# Patient Record
Sex: MALE | Race: BLACK OR AFRICAN AMERICAN | HISPANIC OR LATINO
[De-identification: names, ages, dates, MRNs, and addresses within clinical notes are randomized per-mention and may not be internally consistent; named-entity substitution may affect disease eponyms.]

---

## 2019-09-30 ENCOUNTER — APPOINTMENT (OUTPATIENT)
Dept: ORTHOPEDIC SURGERY | Facility: CLINIC | Age: 49
End: 2019-09-30
Payer: OTHER MISCELLANEOUS

## 2019-09-30 DIAGNOSIS — Z87.39 PERSONAL HISTORY OF OTHER DISEASES OF THE MUSCULOSKELETAL SYSTEM AND CONNECTIVE TISSUE: ICD-10-CM

## 2019-09-30 PROBLEM — Z00.00 ENCOUNTER FOR PREVENTIVE HEALTH EXAMINATION: Status: ACTIVE | Noted: 2019-09-30

## 2019-09-30 PROCEDURE — 99203 OFFICE O/P NEW LOW 30 MIN: CPT

## 2019-09-30 PROCEDURE — 73562 X-RAY EXAM OF KNEE 3: CPT | Mod: 50

## 2019-09-30 RX ORDER — TRAMADOL HYDROCHLORIDE 50 MG/1
50 TABLET, COATED ORAL
Refills: 0 | Status: ACTIVE | COMMUNITY

## 2019-09-30 NOTE — HISTORY OF PRESENT ILLNESS
[Pain Location] : pain [9] : a maximum pain level of 9/10 [] : left knee [Walking] : walking [Daily] : ~He/She~ states the symptoms seem to be occuring daily [Sitting] : sitting [Standing] : standing [de-identified] : 49 year old male s/p left total knee replacement on August 1, 2017 with another surgeon presents to the office today for a second opinion. He initially had 2 arthroscopies done on the left knee in 2016. Pt then went on to have a total knee replacement in August of 2017. Pt continued to have pain and went on to have a manipulation in September 2017, left knee irrigation and debridement with exchange of poly bearing in December of 2017, and a left knee open biopsy in January 2019. In 2018 patient went for a bone scan, white blood cell scan, and a bone marrow scan. Pt continues to complain of pain in the knee that is achy and shooting in nature. He reports swelling, buckling, clicking, locking, and loss of motion. Pt ambulates with a cane and reports only being able to ambulate less than 1/2 block before pain stops him. Pt reports pain and difficulty with negotiating stairs and states he only sleeps about one hour per night due to the pain. He was taking Tramadol for pain with some relief, but just stopped because he was advised to go to pain management. Pt is here today to discuss his next options for pain relief.

## 2019-09-30 NOTE — PHYSICAL EXAM
[de-identified] :  Pt was in so much pain, he was not compliant with physical exam. Knee was clearly swollen.  [de-identified] : Radiographs done today AP lateral and skyline of both knees shows a well laigned cemented PS TKA with no evidence of loosening, or infection.

## 2019-09-30 NOTE — ASSESSMENT
[FreeTextEntry1] : Pt s/p total knee with multiple subsequent procedures with pain and swelling of unknown etiology. I will discuss with Dr Olivas to determine what the next best step is.

## 2020-01-06 ENCOUNTER — APPOINTMENT (OUTPATIENT)
Dept: ORTHOPEDIC SURGERY | Facility: CLINIC | Age: 50
End: 2020-01-06
Payer: OTHER MISCELLANEOUS

## 2020-01-06 PROCEDURE — 36415 COLL VENOUS BLD VENIPUNCTURE: CPT

## 2020-01-06 PROCEDURE — 99213 OFFICE O/P EST LOW 20 MIN: CPT

## 2020-01-06 NOTE — HISTORY OF PRESENT ILLNESS
[Pain Location] : pain [9] : a maximum pain level of 9/10 [] : left knee [Walking] : walking [Sitting] : sitting [Standing] : standing [Daily] : ~He/She~ states the symptoms seem to be occuring daily [de-identified] : 9/30/2019 - 49 year old male s/p left total knee replacement on August 1, 2017 with another surgeon presents to the office today for a second opinion. He initially had 2 arthroscopies done on the left knee in 2016. Pt then went on to have a total knee replacement in August of 2017. Pt continued to have pain and went on to have a manipulation in September 2017, left knee irrigation and debridement with exchange of poly bearing in December of 2017, and a left knee open biopsy in January 2019. In 2018 patient went for a bone scan, white blood cell scan, and a bone marrow scan. Pt continues to complain of pain in the knee that is achy and shooting in nature. He reports swelling, buckling, clicking, locking, and loss of motion. Pt ambulates with a cane and reports only being able to ambulate less than 1/2 block before pain stops him. Pt reports pain and difficulty with negotiating stairs and states he only sleeps about one hour per night due to the pain. He was taking Tramadol for pain with some relief, but just stopped because he was advised to go to pain management. Pt is here today to discuss his next options for pain relief. \par \par 1/6/2020 - 49 year old male s/p left total knee replacement presents to the office today for a metal allergy testing. We have received a metal allergy testing kit from [facility]. Pt continues to complain of pain in the left knee after his knee replacement in August 2017. We will draw blood today for his metal allergy test with orthopedic analysis using Panel 2.

## 2020-01-06 NOTE — PHYSICAL EXAM
[de-identified] :  Pt was in so much pain, he was not compliant with physical exam. Knee was clearly swollen.  [de-identified] : Radiographs done today AP lateral and skyline of both knees shows a well laigned cemented PS TKA with no evidence of loosening, or infection.

## 2020-01-06 NOTE — ASSESSMENT
[FreeTextEntry1] : We will follow up once we receive the results. We have mailed back the blood to be received by orthopedic analysis by tomorrow 17/2019.

## 2020-06-07 ENCOUNTER — TRANSCRIPTION ENCOUNTER (OUTPATIENT)
Age: 50
End: 2020-06-07

## 2020-06-18 ENCOUNTER — TRANSCRIPTION ENCOUNTER (OUTPATIENT)
Age: 50
End: 2020-06-18

## 2020-07-10 ENCOUNTER — APPOINTMENT (OUTPATIENT)
Dept: ORTHOPEDIC SURGERY | Facility: CLINIC | Age: 50
End: 2020-07-10
Payer: OTHER MISCELLANEOUS

## 2020-07-10 DIAGNOSIS — M25.551 PAIN IN RIGHT HIP: ICD-10-CM

## 2020-07-10 DIAGNOSIS — M25.552 PAIN IN LEFT HIP: ICD-10-CM

## 2020-07-10 PROCEDURE — 73562 X-RAY EXAM OF KNEE 3: CPT | Mod: RT

## 2020-07-10 PROCEDURE — 99214 OFFICE O/P EST MOD 30 MIN: CPT

## 2020-07-10 PROCEDURE — 73502 X-RAY EXAM HIP UNI 2-3 VIEWS: CPT | Mod: RT

## 2020-07-10 NOTE — PHYSICAL EXAM
[de-identified] : Radiographs done today AP lateral and skyline of the left knee shows well aligned well fixed cemented PCR TKA. No evidence of loosening\par \par Radiographs done today AP pelvis and lateral of the left hip shows the bony structures are intact , no evidence of arthritis or AVN of the left hip.  [de-identified] : unchanged

## 2020-07-10 NOTE — ASSESSMENT
[FreeTextEntry1] : Pt metal allergy work up is unremarkable. Still under the care of pain management. I spoke to Dr Felix, she said that the pain relief that the patient experienced immediately after the nerve blocks were consistent with RSD. Unfortunately the nerve ablation procedure was not helpful to the patient. She still feels that a lumbar sympathectomy could be beneficial. My exam of the patient today, i feel is consistent with RSD. The pt demonstrated significant hypersensitivity of touch to the skin. His pain is clearly out of proportion to his physical and radiographic findings. I reviewed the bone and white cell scans. These are not consistent with loosening or infection but do show evidence of synovitis. Pt will follow up after pain management.

## 2020-07-10 NOTE — HISTORY OF PRESENT ILLNESS
[de-identified] : 7/10/20- 50 year old male presents for follow up. \par \par 9/30/2019 - 49 year old male s/p left total knee replacement on August 1, 2017 with another surgeon presents to the office today for a second opinion. He initially had 2 arthroscopies done on the left knee in 2016. Pt then went on to have a total knee replacement in August of 2017. Pt continued to have pain and went on to have a manipulation in September 2017, left knee irrigation and debridement with exchange of poly bearing in December of 2017, and a left knee open biopsy in January 2019. In 2018 patient went for a bone scan, white blood cell scan, and a bone marrow scan. Pt continues to complain of pain in the knee that is achy and shooting in nature. He reports swelling, buckling, clicking, locking, and loss of motion. Pt ambulates with a cane and reports only being able to ambulate less than 1/2 block before pain stops him. Pt reports pain and difficulty with negotiating stairs and states he only sleeps about one hour per night due to the pain. He was taking Tramadol for pain with some relief, but just stopped because he was advised to go to pain management. Pt is here today to discuss his next options for pain relief. \par \par 1/6/2020 - 49 year old male s/p left total knee replacement presents to the office today for a metal allergy testing. We have received a metal allergy testing kit from [facility]. Pt continues to complain of pain in the left knee after his knee replacement in August 2017. We will draw blood today for his metal allergy test with orthopedic analysis using Panel 2.  [] : left knee [9] : a maximum pain level of 9/10 [Pain Location] : pain [Sitting] : sitting [Walking] : walking [Daily] : ~He/She~ states the symptoms seem to be occuring daily [Standing] : standing

## 2020-10-26 ENCOUNTER — APPOINTMENT (OUTPATIENT)
Dept: ORTHOPEDIC SURGERY | Facility: CLINIC | Age: 50
End: 2020-10-26
Payer: OTHER MISCELLANEOUS

## 2020-10-26 PROCEDURE — 99072 ADDL SUPL MATRL&STAF TM PHE: CPT

## 2020-10-26 PROCEDURE — 99214 OFFICE O/P EST MOD 30 MIN: CPT

## 2020-12-21 ENCOUNTER — APPOINTMENT (OUTPATIENT)
Dept: ORTHOPEDIC SURGERY | Facility: CLINIC | Age: 50
End: 2020-12-21
Payer: OTHER MISCELLANEOUS

## 2020-12-21 VITALS — TEMPERATURE: 96.9 F

## 2020-12-21 DIAGNOSIS — T84.84XA PAIN DUE TO INTERNAL ORTHOPEDIC PROSTHETIC DEVICES, IMPLANTS AND GRAFTS, INITIAL ENCOUNTER: ICD-10-CM

## 2020-12-21 PROCEDURE — 99072 ADDL SUPL MATRL&STAF TM PHE: CPT

## 2020-12-21 PROCEDURE — 99214 OFFICE O/P EST MOD 30 MIN: CPT | Mod: 57

## 2020-12-21 NOTE — PHYSICAL EXAM
[de-identified] : Left Knee\par Inspection: no effusion or erythema.\par Wounds: well healed incision \par Alignment: normal.\par Palpation: no specific tenderness on palpation.\par ROM: \par Ligamentous laxity: all ligaments appear stable\par Muscle Test: good quad strength.\par Leg examination: calf is soft and non-tender. [de-identified] : 7/10/2020- Radiographs done today AP lateral and skyline of the left knee shows well aligned well fixed cemented PCR TKA. No evidence of loosening\par \par 7/10/2020- Radiographs done today AP pelvis and lateral of the left hip shows the bony structures are intact , no evidence of arthritis or AVN of the left hip.

## 2020-12-21 NOTE — PHYSICAL EXAM
[de-identified] : Left Knee\par Inspection: no effusion or erythema.\par Wounds: well healed incision \par Alignment: normal.\par Palpation: no specific tenderness on palpation.\par ROM: \par Ligamentous laxity: all ligaments appear stable\par Muscle Test: good quad strength.\par Leg examination: calf is soft and non-tender. [de-identified] : 7/10/20- Radiographs done today AP lateral and skyline of the left knee shows well aligned well fixed cemented PCR TKA. No evidence of loosening\par \par 7/10/20- Radiographs done today AP pelvis and lateral of the left hip shows the bony structures are intact , no evidence of arthritis or AVN of the left hip. \par \par 10/26/20- No imaging performed today

## 2020-12-21 NOTE — HISTORY OF PRESENT ILLNESS
[Pain Location] : pain [] : left knee [9] : a maximum pain level of 9/10 [Walking] : walking [Sitting] : sitting [Standing] : standing [Daily] : ~He/She~ states the symptoms seem to be occuring daily [de-identified] : 9/30/2019 - 49 year old male s/p left total knee replacement on August 1, 2017 with another surgeon presents to the office today for a second opinion. He initially had 2 arthroscopies done on the left knee in 2016. Pt then went on to have a total knee replacement in August of 2017. Pt continued to have pain and went on to have a manipulation in September 2017, left knee irrigation and debridement with exchange of poly bearing in December of 2017, and a left knee open biopsy in January 2019. In 2018 patient went for a bone scan, white blood cell scan, and a bone marrow scan. Pt continues to complain of pain in the knee that is achy and shooting in nature. He reports swelling, buckling, clicking, locking, and loss of motion. Pt ambulates with a cane and reports only being able to ambulate less than 1/2 block before pain stops him. Pt reports pain and difficulty with negotiating stairs and states he only sleeps about one hour per night due to the pain. He was taking Tramadol for pain with some relief, but just stopped because he was advised to go to pain management. Pt is here today to discuss his next options for pain relief. \par \par 1/6/2020 - 49 year old male s/p left total knee replacement presents to the office today for a metal allergy testing. We have received a metal allergy testing kit from [facility]. Pt continues to complain of pain in the left knee after his knee replacement in August 2017. We will draw blood today for his metal allergy test with orthopedic analysis using Panel 2. \par \par 7/10/20- 50 year old male presents for follow up of his painful left total knee replacement.\par \par 10/26/2020 - 50 year old male presents to the office today to follow up again on his painful left total knee replacement. Patient states that since we last saw him, patient had a nerve block in his back but states this did not help him. He also reports having an arthroscopy done on the right knee. Patient is currently taking Tramadol and Percocet 10 mg for pain in his knee and back.

## 2020-12-21 NOTE — ASSESSMENT
[FreeTextEntry1] : Pt presents for f/u. He has completed his evaluation by pain management. Unfortunately everything they tried was unsuccessful in providing him with pain relief. I have spoken with his pain management doctor numerous times regarding options. The only thing they have not tried is a pain stimulator. Pt insists on a revision knee replacement before going that route. But he completely understands that id the revision does not provide pain relief than he will have no choice but to have the stimulator inserted into his spine. He also has a questionable PCN allergy, and will therefore see an allergist to see if he can have Ancef. Pt metal allergy testing showed a very mild reaction to nickel, we will therefore avoid cobalt chrome in his implants and femoral component will be made of Oxinium. He will undergo medical eval by PCP and be scheduled for revision Sx as soon as possible. I will be working hand in hand with pain management doctor for christ[facility] op pain management. \par \par The risks, benefits, alternatives of treatment, and aftercare precautions following joint replacement surgery were reviewed with the patient and all questions were answered. Models were used, radiographs reviewed and a brochure was given to the patient. The implant type utilized, including bearing surfaces fixation techniques were reviewed in detail, and the patient chose to proceed with elective joint replacement surgery. The patient's body mass index was recorded in my office notes, and for those patients whose  body mass index of greater than 30, I recommended that they review a weight reduction program with their internist. The importance of smoking cessation was reviewed with all patient's, and for those patients who still smoke, I recommended that they review a smoking cessation program with their internist.

## 2020-12-21 NOTE — HISTORY OF PRESENT ILLNESS
[de-identified] : 9/30/2019 - 49 year old male s/p left total knee replacement on August 1, 2017 with another surgeon presents to the office today for a second opinion. He initially had 2 arthroscopies done on the left knee in 2016. Pt then went on to have a total knee replacement in August of 2017. Pt continued to have pain and went on to have a manipulation in September 2017, left knee irrigation and debridement with exchange of poly bearing in December of 2017, and a left knee open biopsy in January 2019. In 2018 patient went for a bone scan, white blood cell scan, and a bone marrow scan. Pt continues to complain of pain in the knee that is achy and shooting in nature. He reports swelling, buckling, clicking, locking, and loss of motion. Pt ambulates with a cane and reports only being able to ambulate less than 1/2 block before pain stops him. Pt reports pain and difficulty with negotiating stairs and states he only sleeps about one hour per night due to the pain. He was taking Tramadol for pain with some relief, but just stopped because he was advised to go to pain management. Pt is here today to discuss his next options for pain relief. \par \par 1/6/2020 - 49 year old male s/p left total knee replacement presents to the office today for a metal allergy testing. We have received a metal allergy testing kit from [facility]. Pt continues to complain of pain in the left knee after his knee replacement in August 2017. We will draw blood today for his metal allergy test with orthopedic analysis using Panel 2. \par \par 7/10/20- 50 year old male presents for follow up of his painful left total knee replacement.\par \par 10/26/2020 - 50 year old male presents to the office today to follow up again on his painful left total knee replacement. Patient states that since we last saw him, patient had a nerve block in his back but states this did not help him. He also reports having an arthroscopy done on the right knee. Patient is currently taking Tramadol and Percocet 10 mg for pain in his knee and back. \par \par 12/21/2020 - 50 year old male presents to the office today for a follow up on his painful left total knee replacement.  [Pain Location] : pain [] : left knee [9] : a maximum pain level of 9/10 [Walking] : walking [Sitting] : sitting [Standing] : standing [Daily] : ~He/She~ states the symptoms seem to be occuring daily

## 2021-01-05 ENCOUNTER — RESULT REVIEW (OUTPATIENT)
Age: 51
End: 2021-01-05

## 2021-01-05 ENCOUNTER — OUTPATIENT (OUTPATIENT)
Dept: OUTPATIENT SERVICES | Facility: HOSPITAL | Age: 51
LOS: 1 days | Discharge: HOME | End: 2021-01-05
Payer: OTHER MISCELLANEOUS

## 2021-01-05 VITALS
OXYGEN SATURATION: 100 % | DIASTOLIC BLOOD PRESSURE: 97 MMHG | TEMPERATURE: 97 F | SYSTOLIC BLOOD PRESSURE: 156 MMHG | WEIGHT: 195.11 LBS | HEART RATE: 68 BPM | HEIGHT: 67 IN | RESPIRATION RATE: 16 BRPM

## 2021-01-05 DIAGNOSIS — T84.89XA OTHER SPECIFIED COMPLICATION OF INTERNAL ORTHOPEDIC PROSTHETIC DEVICES, IMPLANTS AND GRAFTS, INITIAL ENCOUNTER: ICD-10-CM

## 2021-01-05 DIAGNOSIS — Z96.652 PRESENCE OF LEFT ARTIFICIAL KNEE JOINT: Chronic | ICD-10-CM

## 2021-01-05 DIAGNOSIS — Z01.818 ENCOUNTER FOR OTHER PREPROCEDURAL EXAMINATION: ICD-10-CM

## 2021-01-05 DIAGNOSIS — T84.89XD OTHER SPECIFIED COMPLICATION OF INTERNAL ORTHOPEDIC PROSTHETIC DEVICES, IMPLANTS AND GRAFTS, SUBSEQUENT ENCOUNTER: ICD-10-CM

## 2021-01-05 LAB
A1C WITH ESTIMATED AVERAGE GLUCOSE RESULT: 5.2 % — SIGNIFICANT CHANGE UP (ref 4–5.6)
ALBUMIN SERPL ELPH-MCNC: 4.3 G/DL — SIGNIFICANT CHANGE UP (ref 3.5–5.2)
ALP SERPL-CCNC: 77 U/L — SIGNIFICANT CHANGE UP (ref 30–115)
ALT FLD-CCNC: 20 U/L — SIGNIFICANT CHANGE UP (ref 0–41)
ANION GAP SERPL CALC-SCNC: 10 MMOL/L — SIGNIFICANT CHANGE UP (ref 7–14)
APTT BLD: 41.4 SEC — HIGH (ref 27–39.2)
AST SERPL-CCNC: 22 U/L — SIGNIFICANT CHANGE UP (ref 0–41)
BASOPHILS # BLD AUTO: 0.12 K/UL — SIGNIFICANT CHANGE UP (ref 0–0.2)
BASOPHILS NFR BLD AUTO: 1.5 % — HIGH (ref 0–1)
BILIRUB SERPL-MCNC: 0.3 MG/DL — SIGNIFICANT CHANGE UP (ref 0.2–1.2)
BLD GP AB SCN SERPL QL: SIGNIFICANT CHANGE UP
BUN SERPL-MCNC: 9 MG/DL — LOW (ref 10–20)
CALCIUM SERPL-MCNC: 9.3 MG/DL — SIGNIFICANT CHANGE UP (ref 8.5–10.1)
CHLORIDE SERPL-SCNC: 108 MMOL/L — SIGNIFICANT CHANGE UP (ref 98–110)
CO2 SERPL-SCNC: 23 MMOL/L — SIGNIFICANT CHANGE UP (ref 17–32)
CREAT SERPL-MCNC: 0.9 MG/DL — SIGNIFICANT CHANGE UP (ref 0.7–1.5)
EOSINOPHIL # BLD AUTO: 0.23 K/UL — SIGNIFICANT CHANGE UP (ref 0–0.7)
EOSINOPHIL NFR BLD AUTO: 2.9 % — SIGNIFICANT CHANGE UP (ref 0–8)
ESTIMATED AVERAGE GLUCOSE: 103 MG/DL — SIGNIFICANT CHANGE UP (ref 68–114)
GLUCOSE SERPL-MCNC: 112 MG/DL — HIGH (ref 70–99)
HCT VFR BLD CALC: 43.6 % — SIGNIFICANT CHANGE UP (ref 42–52)
HGB BLD-MCNC: 14.1 G/DL — SIGNIFICANT CHANGE UP (ref 14–18)
IMM GRANULOCYTES NFR BLD AUTO: 0.3 % — SIGNIFICANT CHANGE UP (ref 0.1–0.3)
INR BLD: 1.01 RATIO — SIGNIFICANT CHANGE UP (ref 0.65–1.3)
LYMPHOCYTES # BLD AUTO: 2.67 K/UL — SIGNIFICANT CHANGE UP (ref 1.2–3.4)
LYMPHOCYTES # BLD AUTO: 33.8 % — SIGNIFICANT CHANGE UP (ref 20.5–51.1)
MCHC RBC-ENTMCNC: 31.8 PG — HIGH (ref 27–31)
MCHC RBC-ENTMCNC: 32.3 G/DL — SIGNIFICANT CHANGE UP (ref 32–37)
MCV RBC AUTO: 98.4 FL — HIGH (ref 80–94)
MONOCYTES # BLD AUTO: 0.72 K/UL — HIGH (ref 0.1–0.6)
MONOCYTES NFR BLD AUTO: 9.1 % — SIGNIFICANT CHANGE UP (ref 1.7–9.3)
MRSA PCR RESULT.: NEGATIVE — SIGNIFICANT CHANGE UP
NEUTROPHILS # BLD AUTO: 4.14 K/UL — SIGNIFICANT CHANGE UP (ref 1.4–6.5)
NEUTROPHILS NFR BLD AUTO: 52.4 % — SIGNIFICANT CHANGE UP (ref 42.2–75.2)
NRBC # BLD: 0 /100 WBCS — SIGNIFICANT CHANGE UP (ref 0–0)
PLATELET # BLD AUTO: 331 K/UL — SIGNIFICANT CHANGE UP (ref 130–400)
POTASSIUM SERPL-MCNC: 4.7 MMOL/L — SIGNIFICANT CHANGE UP (ref 3.5–5)
POTASSIUM SERPL-SCNC: 4.7 MMOL/L — SIGNIFICANT CHANGE UP (ref 3.5–5)
PROT SERPL-MCNC: 7 G/DL — SIGNIFICANT CHANGE UP (ref 6–8)
PROTHROM AB SERPL-ACNC: 11.6 SEC — SIGNIFICANT CHANGE UP (ref 9.95–12.87)
RBC # BLD: 4.43 M/UL — LOW (ref 4.7–6.1)
RBC # FLD: 11.9 % — SIGNIFICANT CHANGE UP (ref 11.5–14.5)
SODIUM SERPL-SCNC: 141 MMOL/L — SIGNIFICANT CHANGE UP (ref 135–146)
WBC # BLD: 7.9 K/UL — SIGNIFICANT CHANGE UP (ref 4.8–10.8)
WBC # FLD AUTO: 7.9 K/UL — SIGNIFICANT CHANGE UP (ref 4.8–10.8)

## 2021-01-05 PROCEDURE — 93010 ELECTROCARDIOGRAM REPORT: CPT

## 2021-01-05 PROCEDURE — 71046 X-RAY EXAM CHEST 2 VIEWS: CPT | Mod: 26

## 2021-01-05 PROCEDURE — 72170 X-RAY EXAM OF PELVIS: CPT | Mod: 26

## 2021-01-05 PROCEDURE — 73562 X-RAY EXAM OF KNEE 3: CPT | Mod: 26,LT

## 2021-01-05 NOTE — H&P PST ADULT - NSICDXPASTMEDICALHX_GEN_ALL_CORE_FT
PAST MEDICAL HISTORY:  CRPS 1, lower extremity     Left knee pain      PAST MEDICAL HISTORY:  CRPS 1, lower extremity     Left knee pain     Marijuana user

## 2021-01-05 NOTE — H&P PST ADULT - REASON FOR ADMISSION
revision  left knee replacement scheduled for 1/21/21 under [facility] with DR Gastelum
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
## 2021-01-05 NOTE — H&P PST ADULT - NSANTHOSAYNRD_GEN_A_CORE
No. YLNDA screening performed.  STOP BANG Legend: 0-2 = LOW Risk; 3-4 = INTERMEDIATE Risk; 5-8 = HIGH Risk

## 2021-01-19 ENCOUNTER — NON-APPOINTMENT (OUTPATIENT)
Age: 51
End: 2021-01-19

## 2021-01-19 ENCOUNTER — LABORATORY RESULT (OUTPATIENT)
Age: 51
End: 2021-01-19

## 2021-01-19 ENCOUNTER — OUTPATIENT (OUTPATIENT)
Dept: OUTPATIENT SERVICES | Facility: HOSPITAL | Age: 51
LOS: 1 days | Discharge: HOME | End: 2021-01-19

## 2021-01-19 DIAGNOSIS — Z96.652 PRESENCE OF LEFT ARTIFICIAL KNEE JOINT: Chronic | ICD-10-CM

## 2021-01-19 DIAGNOSIS — Z11.59 ENCOUNTER FOR SCREENING FOR OTHER VIRAL DISEASES: ICD-10-CM

## 2021-01-19 PROBLEM — M25.562 PAIN IN LEFT KNEE: Chronic | Status: ACTIVE | Noted: 2021-01-05

## 2021-01-19 PROBLEM — F12.90 CANNABIS USE, UNSPECIFIED, UNCOMPLICATED: Chronic | Status: ACTIVE | Noted: 2021-01-05

## 2021-01-19 PROBLEM — G90.529 COMPLEX REGIONAL PAIN SYNDROME I OF UNSPECIFIED LOWER LIMB: Chronic | Status: ACTIVE | Noted: 2021-01-05

## 2021-02-04 ENCOUNTER — APPOINTMENT (OUTPATIENT)
Dept: ORTHOPEDIC SURGERY | Facility: CLINIC | Age: 51
End: 2021-02-04

## 2021-03-04 ENCOUNTER — APPOINTMENT (OUTPATIENT)
Dept: ORTHOPEDIC SURGERY | Facility: CLINIC | Age: 51
End: 2021-03-04

## 2021-03-09 ENCOUNTER — OUTPATIENT (OUTPATIENT)
Dept: OUTPATIENT SERVICES | Facility: HOSPITAL | Age: 51
LOS: 1 days | Discharge: HOME | End: 2021-03-09
Payer: OTHER MISCELLANEOUS

## 2021-03-09 VITALS
HEIGHT: 67 IN | WEIGHT: 190.7 LBS | TEMPERATURE: 97 F | RESPIRATION RATE: 18 BRPM | HEART RATE: 68 BPM | OXYGEN SATURATION: 98 % | SYSTOLIC BLOOD PRESSURE: 162 MMHG | DIASTOLIC BLOOD PRESSURE: 93 MMHG

## 2021-03-09 DIAGNOSIS — Z01.818 ENCOUNTER FOR OTHER PREPROCEDURAL EXAMINATION: ICD-10-CM

## 2021-03-09 DIAGNOSIS — Z96.652 PRESENCE OF LEFT ARTIFICIAL KNEE JOINT: Chronic | ICD-10-CM

## 2021-03-09 DIAGNOSIS — T84.84XD PAIN DUE TO INTERNAL ORTHOPEDIC PROSTHETIC DEVICES, IMPLANTS AND GRAFTS, SUBSEQUENT ENCOUNTER: ICD-10-CM

## 2021-03-09 DIAGNOSIS — Z98.890 OTHER SPECIFIED POSTPROCEDURAL STATES: Chronic | ICD-10-CM

## 2021-03-09 LAB
A1C WITH ESTIMATED AVERAGE GLUCOSE RESULT: 5.2 % — SIGNIFICANT CHANGE UP (ref 4–5.6)
ALBUMIN SERPL ELPH-MCNC: 4.4 G/DL — SIGNIFICANT CHANGE UP (ref 3.5–5.2)
ALP SERPL-CCNC: 76 U/L — SIGNIFICANT CHANGE UP (ref 30–115)
ALT FLD-CCNC: 16 U/L — SIGNIFICANT CHANGE UP (ref 0–41)
ANION GAP SERPL CALC-SCNC: 11 MMOL/L — SIGNIFICANT CHANGE UP (ref 7–14)
APTT BLD: 41.8 SEC — HIGH (ref 27–39.2)
AST SERPL-CCNC: 16 U/L — SIGNIFICANT CHANGE UP (ref 0–41)
BASOPHILS # BLD AUTO: 0.1 K/UL — SIGNIFICANT CHANGE UP (ref 0–0.2)
BASOPHILS NFR BLD AUTO: 1.3 % — HIGH (ref 0–1)
BILIRUB SERPL-MCNC: 0.3 MG/DL — SIGNIFICANT CHANGE UP (ref 0.2–1.2)
BLD GP AB SCN SERPL QL: SIGNIFICANT CHANGE UP
BUN SERPL-MCNC: 11 MG/DL — SIGNIFICANT CHANGE UP (ref 10–20)
CALCIUM SERPL-MCNC: 9.5 MG/DL — SIGNIFICANT CHANGE UP (ref 8.5–10.1)
CHLORIDE SERPL-SCNC: 106 MMOL/L — SIGNIFICANT CHANGE UP (ref 98–110)
CO2 SERPL-SCNC: 23 MMOL/L — SIGNIFICANT CHANGE UP (ref 17–32)
CREAT SERPL-MCNC: 0.9 MG/DL — SIGNIFICANT CHANGE UP (ref 0.7–1.5)
EOSINOPHIL # BLD AUTO: 0.19 K/UL — SIGNIFICANT CHANGE UP (ref 0–0.7)
EOSINOPHIL NFR BLD AUTO: 2.4 % — SIGNIFICANT CHANGE UP (ref 0–8)
ESTIMATED AVERAGE GLUCOSE: 103 MG/DL — SIGNIFICANT CHANGE UP (ref 68–114)
GLUCOSE SERPL-MCNC: 93 MG/DL — SIGNIFICANT CHANGE UP (ref 70–99)
HCT VFR BLD CALC: 45.6 % — SIGNIFICANT CHANGE UP (ref 42–52)
HGB BLD-MCNC: 14.9 G/DL — SIGNIFICANT CHANGE UP (ref 14–18)
IMM GRANULOCYTES NFR BLD AUTO: 0.3 % — SIGNIFICANT CHANGE UP (ref 0.1–0.3)
INR BLD: 1.08 RATIO — SIGNIFICANT CHANGE UP (ref 0.65–1.3)
LYMPHOCYTES # BLD AUTO: 2.41 K/UL — SIGNIFICANT CHANGE UP (ref 1.2–3.4)
LYMPHOCYTES # BLD AUTO: 30.2 % — SIGNIFICANT CHANGE UP (ref 20.5–51.1)
MCHC RBC-ENTMCNC: 31.7 PG — HIGH (ref 27–31)
MCHC RBC-ENTMCNC: 32.7 G/DL — SIGNIFICANT CHANGE UP (ref 32–37)
MCV RBC AUTO: 97 FL — HIGH (ref 80–94)
MONOCYTES # BLD AUTO: 0.59 K/UL — SIGNIFICANT CHANGE UP (ref 0.1–0.6)
MONOCYTES NFR BLD AUTO: 7.4 % — SIGNIFICANT CHANGE UP (ref 1.7–9.3)
MRSA PCR RESULT.: NEGATIVE — SIGNIFICANT CHANGE UP
NEUTROPHILS # BLD AUTO: 4.66 K/UL — SIGNIFICANT CHANGE UP (ref 1.4–6.5)
NEUTROPHILS NFR BLD AUTO: 58.4 % — SIGNIFICANT CHANGE UP (ref 42.2–75.2)
NRBC # BLD: 0 /100 WBCS — SIGNIFICANT CHANGE UP (ref 0–0)
PLATELET # BLD AUTO: 331 K/UL — SIGNIFICANT CHANGE UP (ref 130–400)
POTASSIUM SERPL-MCNC: 4.1 MMOL/L — SIGNIFICANT CHANGE UP (ref 3.5–5)
POTASSIUM SERPL-SCNC: 4.1 MMOL/L — SIGNIFICANT CHANGE UP (ref 3.5–5)
PROT SERPL-MCNC: 7.3 G/DL — SIGNIFICANT CHANGE UP (ref 6–8)
PROTHROM AB SERPL-ACNC: 12.4 SEC — SIGNIFICANT CHANGE UP (ref 9.95–12.87)
RBC # BLD: 4.7 M/UL — SIGNIFICANT CHANGE UP (ref 4.7–6.1)
RBC # FLD: 11.8 % — SIGNIFICANT CHANGE UP (ref 11.5–14.5)
SODIUM SERPL-SCNC: 140 MMOL/L — SIGNIFICANT CHANGE UP (ref 135–146)
WBC # BLD: 7.97 K/UL — SIGNIFICANT CHANGE UP (ref 4.8–10.8)
WBC # FLD AUTO: 7.97 K/UL — SIGNIFICANT CHANGE UP (ref 4.8–10.8)

## 2021-03-09 PROCEDURE — 93010 ELECTROCARDIOGRAM REPORT: CPT

## 2021-03-09 NOTE — H&P PST ADULT - NSANTHOSAYNRD_GEN_A_CORE
No. LYNDA screening performed.  STOP BANG Legend: 0-2 = LOW Risk; 3-4 = INTERMEDIATE Risk; 5-8 = HIGH Risk

## 2021-03-09 NOTE — H&P PST ADULT - REASON FOR ADMISSION
Abel Morrow a 50 yo male presents to [facility] for Revision left total knee replacement scheduled for 04/1/2021 [facility] with DR Gastelum.  Covid testing on 03/9/2021 at 0800 Abel Morrow is a 52 yo male presents to [facility] for Revision left total knee replacement scheduled for 04/1/2021 under regional [facility] with DR Gastelum.  Covid testing on 03/9/2021 at 0800

## 2021-03-09 NOTE — H&P PST ADULT - NSICDXPASTSURGICALHX_GEN_ALL_CORE_FT
PAST SURGICAL HISTORY:  H/O left knee surgery 11 times since 2016    History of left knee replacement 2017    S/P arthroscopy of right knee 08/2020
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
## 2021-04-02 ENCOUNTER — NON-APPOINTMENT (OUTPATIENT)
Age: 51
End: 2021-04-02

## 2021-04-05 PROBLEM — T14.90XA INJURY, UNSPECIFIED, INITIAL ENCOUNTER: Chronic | Status: ACTIVE | Noted: 2021-03-09

## 2021-04-06 ENCOUNTER — OUTPATIENT (OUTPATIENT)
Dept: OUTPATIENT SERVICES | Facility: HOSPITAL | Age: 51
LOS: 1 days | Discharge: HOME | End: 2021-04-06

## 2021-04-06 VITALS
DIASTOLIC BLOOD PRESSURE: 90 MMHG | OXYGEN SATURATION: 99 % | WEIGHT: 196.21 LBS | HEIGHT: 67 IN | RESPIRATION RATE: 18 BRPM | TEMPERATURE: 97 F | HEART RATE: 60 BPM | SYSTOLIC BLOOD PRESSURE: 166 MMHG

## 2021-04-06 DIAGNOSIS — T84.84XD PAIN DUE TO INTERNAL ORTHOPEDIC PROSTHETIC DEVICES, IMPLANTS AND GRAFTS, SUBSEQUENT ENCOUNTER: ICD-10-CM

## 2021-04-06 DIAGNOSIS — T84.84XA PAIN DUE TO INTERNAL ORTHOPEDIC PROSTHETIC DEVICES, IMPLANTS AND GRAFTS, INITIAL ENCOUNTER: ICD-10-CM

## 2021-04-06 DIAGNOSIS — Z98.890 OTHER SPECIFIED POSTPROCEDURAL STATES: Chronic | ICD-10-CM

## 2021-04-06 DIAGNOSIS — Z96.652 PRESENCE OF LEFT ARTIFICIAL KNEE JOINT: Chronic | ICD-10-CM

## 2021-04-06 DIAGNOSIS — Z01.818 ENCOUNTER FOR OTHER PREPROCEDURAL EXAMINATION: ICD-10-CM

## 2021-04-06 LAB
APTT BLD: 42 SEC — HIGH (ref 27–39.2)
BLD GP AB SCN SERPL QL: SIGNIFICANT CHANGE UP
INR BLD: 1.06 RATIO — SIGNIFICANT CHANGE UP (ref 0.65–1.3)
PROTHROM AB SERPL-ACNC: 12.2 SEC — SIGNIFICANT CHANGE UP (ref 9.95–12.87)

## 2021-04-06 NOTE — H&P PST ADULT - PRIMARY CARE PROVIDER
ANTICOAGULATION FOLLOW-UP CLINIC VISIT    Patient Name:  Amira Arreola  Date:  7/11/2018  Contact Type:  Telephone/ Dose instructions given to patient's daughter    SUBJECTIVE:     Patient Findings     Positives No Problem Findings           OBJECTIVE    INR   Date Value Ref Range Status   07/11/2018 2.08 (H) 0.86 - 1.14 Final       ASSESSMENT / PLAN  INR assessment THER    Recheck INR In: 1 WEEK    INR Location Outside lab      Anticoagulation Summary as of 7/11/2018     INR goal 2.0-3.0   Today's INR 2.08   Warfarin maintenance plan 4 mg (4 mg x 1) every day   Full warfarin instructions 4 mg every day   Weekly warfarin total 28 mg   No change documented Cintia Powers RN   Plan last modified Bri Mcbride RN (6/7/2018)   Next INR check 7/18/2018   Target end date Indefinite    Indications   Long-term (current) use of anticoagulants [Z79.01] [Z79.01]  Atrial fibrillation (H) [I48.91] (Resolved) [I48.91]         Anticoagulation Episode Summary     INR check location     Preferred lab     Send INR reminders to [facility] ANTICOAGULATION    Comments patient have standing INR order to be draw at infusion visit.  advise to call [facility] when have INR draw for dosing instruction.  patient can be reach at home phone [phone]      Anticoagulation Care Providers     Provider Role Specialty Phone number    Addy Frias MD [facility] [phone]            See the Encounter Report to view Anticoagulation Flowsheet and Dosing Calendar (Go to Encounters tab in chart review, and find the Anticoagulation Therapy Visit)    Dosage adjustment made based on physician directed care plan.    Cintia Powers RN               
Dr. Raul Sanchez ( [phone]

## 2021-04-06 NOTE — H&P PST ADULT - REASON FOR ADMISSION
Abel Morrow is a 52 yo male presents to [facility] for Revision left total knee replacement scheduled for 04/27/2021 under [facility] with DR Gastelum.  Covid testing on 04/24/2021 at 0800
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
## 2021-04-24 ENCOUNTER — LABORATORY RESULT (OUTPATIENT)
Age: 51
End: 2021-04-24

## 2021-04-24 ENCOUNTER — OUTPATIENT (OUTPATIENT)
Dept: OUTPATIENT SERVICES | Facility: HOSPITAL | Age: 51
LOS: 1 days | Discharge: HOME | End: 2021-04-24

## 2021-04-24 DIAGNOSIS — Z96.652 PRESENCE OF LEFT ARTIFICIAL KNEE JOINT: Chronic | ICD-10-CM

## 2021-04-24 DIAGNOSIS — Z98.890 OTHER SPECIFIED POSTPROCEDURAL STATES: Chronic | ICD-10-CM

## 2021-04-24 DIAGNOSIS — Z11.59 ENCOUNTER FOR SCREENING FOR OTHER VIRAL DISEASES: ICD-10-CM

## 2021-04-26 ENCOUNTER — FORM ENCOUNTER (OUTPATIENT)
Age: 51
End: 2021-04-26

## 2021-04-26 NOTE — ASU PATIENT PROFILE, ADULT - PSH
H/O left knee surgery  11 times since 2016  History of left knee replacement  2017  S/P arthroscopy of right knee  08/2020

## 2021-04-27 ENCOUNTER — INPATIENT (INPATIENT)
Facility: HOSPITAL | Age: 51
LOS: 2 days | Discharge: HOME HEALTH | End: 2021-04-30
Attending: ORTHOPAEDIC SURGERY | Admitting: ORTHOPAEDIC SURGERY
Payer: OTHER MISCELLANEOUS

## 2021-04-27 ENCOUNTER — APPOINTMENT (OUTPATIENT)
Dept: ORTHOPEDIC SURGERY | Facility: HOSPITAL | Age: 51
End: 2021-04-27
Payer: OTHER MISCELLANEOUS

## 2021-04-27 VITALS
DIASTOLIC BLOOD PRESSURE: 110 MMHG | OXYGEN SATURATION: 100 % | HEART RATE: 66 BPM | SYSTOLIC BLOOD PRESSURE: 165 MMHG | HEIGHT: 67 IN | RESPIRATION RATE: 18 BRPM | WEIGHT: 188.94 LBS | TEMPERATURE: 97 F

## 2021-04-27 DIAGNOSIS — Z98.890 OTHER SPECIFIED POSTPROCEDURAL STATES: Chronic | ICD-10-CM

## 2021-04-27 DIAGNOSIS — Z96.652 PRESENCE OF LEFT ARTIFICIAL KNEE JOINT: Chronic | ICD-10-CM

## 2021-04-27 LAB
B PERT IGG+IGM PNL SER: ABNORMAL
COLOR FLD: SIGNIFICANT CHANGE UP
FLUID INTAKE SUBSTANCE CLASS: SIGNIFICANT CHANGE UP
FLUID SEGMENTED GRANULOCYTES: 78 % — SIGNIFICANT CHANGE UP
GLUCOSE BLDC GLUCOMTR-MCNC: 140 MG/DL — HIGH (ref 70–99)
GLUCOSE BLDC GLUCOMTR-MCNC: 87 MG/DL — SIGNIFICANT CHANGE UP (ref 70–99)
GRAM STN FLD: SIGNIFICANT CHANGE UP
HCT VFR BLD CALC: 45 % — SIGNIFICANT CHANGE UP (ref 42–52)
HGB BLD-MCNC: 14.8 G/DL — SIGNIFICANT CHANGE UP (ref 14–18)
LYMPHOCYTES # FLD: 20 % — SIGNIFICANT CHANGE UP
MCHC RBC-ENTMCNC: 32.1 PG — HIGH (ref 27–31)
MCHC RBC-ENTMCNC: 32.9 G/DL — SIGNIFICANT CHANGE UP (ref 32–37)
MCV RBC AUTO: 97.6 FL — HIGH (ref 80–94)
MONOS+MACROS # FLD: 2 % — SIGNIFICANT CHANGE UP
NRBC # BLD: 0 /100 WBCS — SIGNIFICANT CHANGE UP (ref 0–0)
PLATELET # BLD AUTO: 300 K/UL — SIGNIFICANT CHANGE UP (ref 130–400)
RBC # BLD: 4.61 M/UL — LOW (ref 4.7–6.1)
RBC # FLD: 11.9 % — SIGNIFICANT CHANGE UP (ref 11.5–14.5)
RCV VOL RI: HIGH /UL (ref 0–0)
SPECIMEN SOURCE FLD: SIGNIFICANT CHANGE UP
SPECIMEN SOURCE: SIGNIFICANT CHANGE UP
TOTAL NUCLEATED CELL COUNT, BODY FLUID: 524 /UL — SIGNIFICANT CHANGE UP
TUBE TYPE: SIGNIFICANT CHANGE UP
WBC # BLD: 14.49 K/UL — HIGH (ref 4.8–10.8)
WBC # FLD AUTO: 14.49 K/UL — HIGH (ref 4.8–10.8)

## 2021-04-27 PROCEDURE — 27487 REVISE/REPLACE KNEE JOINT: CPT | Mod: LT

## 2021-04-27 RX ORDER — CEFAZOLIN SODIUM 1 G
2000 VIAL (EA) INJECTION EVERY 8 HOURS
Refills: 0 | Status: COMPLETED | OUTPATIENT
Start: 2021-04-27 | End: 2021-04-28

## 2021-04-27 RX ORDER — ACETAMINOPHEN 500 MG
650 TABLET ORAL EVERY 6 HOURS
Refills: 0 | Status: DISCONTINUED | OUTPATIENT
Start: 2021-04-27 | End: 2021-04-30

## 2021-04-27 RX ORDER — ONDANSETRON 8 MG/1
4 TABLET, FILM COATED ORAL EVERY 6 HOURS
Refills: 0 | Status: DISCONTINUED | OUTPATIENT
Start: 2021-04-27 | End: 2021-04-30

## 2021-04-27 RX ORDER — PANTOPRAZOLE SODIUM 20 MG/1
40 TABLET, DELAYED RELEASE ORAL
Refills: 0 | Status: DISCONTINUED | OUTPATIENT
Start: 2021-04-27 | End: 2021-04-30

## 2021-04-27 RX ORDER — DIPHENHYDRAMINE HCL 50 MG
25 CAPSULE ORAL EVERY 8 HOURS
Refills: 0 | Status: COMPLETED | OUTPATIENT
Start: 2021-04-27 | End: 2021-04-28

## 2021-04-27 RX ORDER — SODIUM CHLORIDE 9 MG/ML
1000 INJECTION, SOLUTION INTRAVENOUS
Refills: 0 | Status: DISCONTINUED | OUTPATIENT
Start: 2021-04-27 | End: 2021-04-27

## 2021-04-27 RX ORDER — FERROUS SULFATE 325(65) MG
325 TABLET ORAL DAILY
Refills: 0 | Status: DISCONTINUED | OUTPATIENT
Start: 2021-04-27 | End: 2021-04-30

## 2021-04-27 RX ORDER — HYDRALAZINE HCL 50 MG
10 TABLET ORAL ONCE
Refills: 0 | Status: COMPLETED | OUTPATIENT
Start: 2021-04-27 | End: 2021-04-27

## 2021-04-27 RX ORDER — OXYCODONE HYDROCHLORIDE 5 MG/1
5 TABLET ORAL EVERY 4 HOURS
Refills: 0 | Status: DISCONTINUED | OUTPATIENT
Start: 2021-04-27 | End: 2021-04-30

## 2021-04-27 RX ORDER — HYDROMORPHONE HYDROCHLORIDE 2 MG/ML
0.5 INJECTION INTRAMUSCULAR; INTRAVENOUS; SUBCUTANEOUS
Refills: 0 | Status: DISCONTINUED | OUTPATIENT
Start: 2021-04-27 | End: 2021-04-27

## 2021-04-27 RX ORDER — OXYCODONE HYDROCHLORIDE 5 MG/1
10 TABLET ORAL EVERY 4 HOURS
Refills: 0 | Status: DISCONTINUED | OUTPATIENT
Start: 2021-04-27 | End: 2021-04-30

## 2021-04-27 RX ORDER — POLYETHYLENE GLYCOL 3350 17 G/17G
17 POWDER, FOR SOLUTION ORAL AT BEDTIME
Refills: 0 | Status: DISCONTINUED | OUTPATIENT
Start: 2021-04-27 | End: 2021-04-30

## 2021-04-27 RX ORDER — SENNA PLUS 8.6 MG/1
2 TABLET ORAL AT BEDTIME
Refills: 0 | Status: DISCONTINUED | OUTPATIENT
Start: 2021-04-27 | End: 2021-04-30

## 2021-04-27 RX ORDER — CELECOXIB 200 MG/1
400 CAPSULE ORAL ONCE
Refills: 0 | Status: COMPLETED | OUTPATIENT
Start: 2021-04-27 | End: 2021-04-27

## 2021-04-27 RX ORDER — ACETAMINOPHEN 500 MG
1000 TABLET ORAL ONCE
Refills: 0 | Status: COMPLETED | OUTPATIENT
Start: 2021-04-27 | End: 2021-04-27

## 2021-04-27 RX ORDER — CHLORHEXIDINE GLUCONATE 213 G/1000ML
1 SOLUTION TOPICAL
Refills: 0 | Status: DISCONTINUED | OUTPATIENT
Start: 2021-04-27 | End: 2021-04-30

## 2021-04-27 RX ORDER — SODIUM CHLORIDE 9 MG/ML
1000 INJECTION INTRAMUSCULAR; INTRAVENOUS; SUBCUTANEOUS
Refills: 0 | Status: DISCONTINUED | OUTPATIENT
Start: 2021-04-27 | End: 2021-04-28

## 2021-04-27 RX ORDER — ASPIRIN/CALCIUM CARB/MAGNESIUM 324 MG
81 TABLET ORAL EVERY 12 HOURS
Refills: 0 | Status: DISCONTINUED | OUTPATIENT
Start: 2021-04-27 | End: 2021-04-30

## 2021-04-27 RX ORDER — TRAMADOL HYDROCHLORIDE 50 MG/1
50 TABLET ORAL EVERY 6 HOURS
Refills: 0 | Status: DISCONTINUED | OUTPATIENT
Start: 2021-04-27 | End: 2021-04-30

## 2021-04-27 RX ORDER — ONDANSETRON 8 MG/1
4 TABLET, FILM COATED ORAL ONCE
Refills: 0 | Status: DISCONTINUED | OUTPATIENT
Start: 2021-04-27 | End: 2021-04-27

## 2021-04-27 RX ORDER — KETOROLAC TROMETHAMINE 30 MG/ML
15 SYRINGE (ML) INJECTION EVERY 6 HOURS
Refills: 0 | Status: DISCONTINUED | OUTPATIENT
Start: 2021-04-27 | End: 2021-04-28

## 2021-04-27 RX ORDER — CELECOXIB 200 MG/1
200 CAPSULE ORAL EVERY 12 HOURS
Refills: 0 | Status: DISCONTINUED | OUTPATIENT
Start: 2021-04-28 | End: 2021-04-30

## 2021-04-27 RX ORDER — HYDRALAZINE HCL 50 MG
10 TABLET ORAL ONCE
Refills: 0 | Status: DISCONTINUED | OUTPATIENT
Start: 2021-04-27 | End: 2021-04-27

## 2021-04-27 RX ORDER — MORPHINE SULFATE 50 MG/1
2 CAPSULE, EXTENDED RELEASE ORAL
Refills: 0 | Status: DISCONTINUED | OUTPATIENT
Start: 2021-04-27 | End: 2021-04-27

## 2021-04-27 RX ORDER — HYDROCORTISONE 20 MG
40 TABLET ORAL EVERY 8 HOURS
Refills: 0 | Status: COMPLETED | OUTPATIENT
Start: 2021-04-27 | End: 2021-04-28

## 2021-04-27 RX ADMIN — Medication 40 MILLIGRAM(S): at 23:39

## 2021-04-27 RX ADMIN — Medication 1000 MILLIGRAM(S): at 11:06

## 2021-04-27 RX ADMIN — Medication 25 MILLIGRAM(S): at 23:38

## 2021-04-27 RX ADMIN — POLYETHYLENE GLYCOL 3350 17 GRAM(S): 17 POWDER, FOR SOLUTION ORAL at 23:38

## 2021-04-27 RX ADMIN — CELECOXIB 400 MILLIGRAM(S): 200 CAPSULE ORAL at 11:06

## 2021-04-27 RX ADMIN — HYDROMORPHONE HYDROCHLORIDE 0.5 MILLIGRAM(S): 2 INJECTION INTRAMUSCULAR; INTRAVENOUS; SUBCUTANEOUS at 20:28

## 2021-04-27 RX ADMIN — Medication 81 MILLIGRAM(S): at 23:38

## 2021-04-27 RX ADMIN — HYDROMORPHONE HYDROCHLORIDE 0.5 MILLIGRAM(S): 2 INJECTION INTRAMUSCULAR; INTRAVENOUS; SUBCUTANEOUS at 20:18

## 2021-04-27 RX ADMIN — SENNA PLUS 2 TABLET(S): 8.6 TABLET ORAL at 23:38

## 2021-04-27 RX ADMIN — Medication 10 MILLIGRAM(S): at 23:30

## 2021-04-28 LAB
ANION GAP SERPL CALC-SCNC: 8 MMOL/L — SIGNIFICANT CHANGE UP (ref 7–14)
ANION GAP SERPL CALC-SCNC: 9 MMOL/L — SIGNIFICANT CHANGE UP (ref 7–14)
BUN SERPL-MCNC: 13 MG/DL — SIGNIFICANT CHANGE UP (ref 10–20)
BUN SERPL-MCNC: 15 MG/DL — SIGNIFICANT CHANGE UP (ref 10–20)
CALCIUM SERPL-MCNC: 9.3 MG/DL — SIGNIFICANT CHANGE UP (ref 8.5–10.1)
CALCIUM SERPL-MCNC: 9.6 MG/DL — SIGNIFICANT CHANGE UP (ref 8.5–10.1)
CHLORIDE SERPL-SCNC: 108 MMOL/L — SIGNIFICANT CHANGE UP (ref 98–110)
CHLORIDE SERPL-SCNC: 109 MMOL/L — SIGNIFICANT CHANGE UP (ref 98–110)
CO2 SERPL-SCNC: 25 MMOL/L — SIGNIFICANT CHANGE UP (ref 17–32)
CO2 SERPL-SCNC: 25 MMOL/L — SIGNIFICANT CHANGE UP (ref 17–32)
CREAT SERPL-MCNC: 1.1 MG/DL — SIGNIFICANT CHANGE UP (ref 0.7–1.5)
CREAT SERPL-MCNC: 1.1 MG/DL — SIGNIFICANT CHANGE UP (ref 0.7–1.5)
GLUCOSE BLDC GLUCOMTR-MCNC: 243 MG/DL — HIGH (ref 70–99)
GLUCOSE SERPL-MCNC: 121 MG/DL — HIGH (ref 70–99)
GLUCOSE SERPL-MCNC: 146 MG/DL — HIGH (ref 70–99)
HCT VFR BLD CALC: 34.4 % — LOW (ref 42–52)
HCT VFR BLD CALC: 35.7 % — LOW (ref 42–52)
HGB BLD-MCNC: 11.5 G/DL — LOW (ref 14–18)
HGB BLD-MCNC: 11.6 G/DL — LOW (ref 14–18)
MCHC RBC-ENTMCNC: 32.3 PG — HIGH (ref 27–31)
MCHC RBC-ENTMCNC: 32.5 G/DL — SIGNIFICANT CHANGE UP (ref 32–37)
MCHC RBC-ENTMCNC: 32.6 PG — HIGH (ref 27–31)
MCHC RBC-ENTMCNC: 33.4 G/DL — SIGNIFICANT CHANGE UP (ref 32–37)
MCV RBC AUTO: 97.5 FL — HIGH (ref 80–94)
MCV RBC AUTO: 99.4 FL — HIGH (ref 80–94)
NIGHT BLUE STAIN TISS: SIGNIFICANT CHANGE UP
NRBC # BLD: 0 /100 WBCS — SIGNIFICANT CHANGE UP (ref 0–0)
NRBC # BLD: 0 /100 WBCS — SIGNIFICANT CHANGE UP (ref 0–0)
PLATELET # BLD AUTO: 266 K/UL — SIGNIFICANT CHANGE UP (ref 130–400)
PLATELET # BLD AUTO: 272 K/UL — SIGNIFICANT CHANGE UP (ref 130–400)
POTASSIUM SERPL-MCNC: 4 MMOL/L — SIGNIFICANT CHANGE UP (ref 3.5–5)
POTASSIUM SERPL-MCNC: 4.3 MMOL/L — SIGNIFICANT CHANGE UP (ref 3.5–5)
POTASSIUM SERPL-SCNC: 4 MMOL/L — SIGNIFICANT CHANGE UP (ref 3.5–5)
POTASSIUM SERPL-SCNC: 4.3 MMOL/L — SIGNIFICANT CHANGE UP (ref 3.5–5)
RBC # BLD: 3.53 M/UL — LOW (ref 4.7–6.1)
RBC # BLD: 3.59 M/UL — LOW (ref 4.7–6.1)
RBC # FLD: 11.8 % — SIGNIFICANT CHANGE UP (ref 11.5–14.5)
RBC # FLD: 11.9 % — SIGNIFICANT CHANGE UP (ref 11.5–14.5)
SODIUM SERPL-SCNC: 142 MMOL/L — SIGNIFICANT CHANGE UP (ref 135–146)
SODIUM SERPL-SCNC: 142 MMOL/L — SIGNIFICANT CHANGE UP (ref 135–146)
SPECIMEN SOURCE: SIGNIFICANT CHANGE UP
WBC # BLD: 20.11 K/UL — HIGH (ref 4.8–10.8)
WBC # BLD: 20.92 K/UL — HIGH (ref 4.8–10.8)
WBC # FLD AUTO: 20.11 K/UL — HIGH (ref 4.8–10.8)
WBC # FLD AUTO: 20.92 K/UL — HIGH (ref 4.8–10.8)

## 2021-04-28 PROCEDURE — 99231 SBSQ HOSP IP/OBS SF/LOW 25: CPT

## 2021-04-28 RX ORDER — DILTIAZEM HCL 120 MG
10 CAPSULE, EXT RELEASE 24 HR ORAL EVERY 6 HOURS
Refills: 0 | Status: DISCONTINUED | OUTPATIENT
Start: 2021-04-28 | End: 2021-04-29

## 2021-04-28 RX ORDER — ADENOSINE 3 MG/ML
6 INJECTION INTRAVENOUS ONCE
Refills: 0 | Status: COMPLETED | OUTPATIENT
Start: 2021-04-28 | End: 2021-04-28

## 2021-04-28 RX ADMIN — Medication 325 MILLIGRAM(S): at 12:01

## 2021-04-28 RX ADMIN — Medication 15 MILLIGRAM(S): at 01:15

## 2021-04-28 RX ADMIN — Medication 650 MILLIGRAM(S): at 11:36

## 2021-04-28 RX ADMIN — Medication 650 MILLIGRAM(S): at 06:23

## 2021-04-28 RX ADMIN — Medication 15 MILLIGRAM(S): at 12:05

## 2021-04-28 RX ADMIN — Medication 15 MILLIGRAM(S): at 18:12

## 2021-04-28 RX ADMIN — SENNA PLUS 2 TABLET(S): 8.6 TABLET ORAL at 21:55

## 2021-04-28 RX ADMIN — Medication 15 MILLIGRAM(S): at 06:53

## 2021-04-28 RX ADMIN — Medication 15 MILLIGRAM(S): at 00:39

## 2021-04-28 RX ADMIN — PANTOPRAZOLE SODIUM 40 MILLIGRAM(S): 20 TABLET, DELAYED RELEASE ORAL at 06:24

## 2021-04-28 RX ADMIN — CELECOXIB 200 MILLIGRAM(S): 200 CAPSULE ORAL at 22:53

## 2021-04-28 RX ADMIN — POLYETHYLENE GLYCOL 3350 17 GRAM(S): 17 POWDER, FOR SOLUTION ORAL at 21:55

## 2021-04-28 RX ADMIN — Medication 15 MILLIGRAM(S): at 06:24

## 2021-04-28 RX ADMIN — OXYCODONE HYDROCHLORIDE 5 MILLIGRAM(S): 5 TABLET ORAL at 23:58

## 2021-04-28 RX ADMIN — Medication 100 MILLIGRAM(S): at 09:31

## 2021-04-28 RX ADMIN — OXYCODONE HYDROCHLORIDE 10 MILLIGRAM(S): 5 TABLET ORAL at 22:54

## 2021-04-28 RX ADMIN — OXYCODONE HYDROCHLORIDE 10 MILLIGRAM(S): 5 TABLET ORAL at 21:55

## 2021-04-28 RX ADMIN — Medication 650 MILLIGRAM(S): at 12:06

## 2021-04-28 RX ADMIN — Medication 650 MILLIGRAM(S): at 00:40

## 2021-04-28 RX ADMIN — Medication 650 MILLIGRAM(S): at 18:11

## 2021-04-28 RX ADMIN — Medication 81 MILLIGRAM(S): at 12:01

## 2021-04-28 RX ADMIN — Medication 650 MILLIGRAM(S): at 06:53

## 2021-04-28 RX ADMIN — Medication 100 MILLIGRAM(S): at 00:40

## 2021-04-28 RX ADMIN — Medication 1 TABLET(S): at 12:01

## 2021-04-28 RX ADMIN — Medication 650 MILLIGRAM(S): at 23:58

## 2021-04-28 RX ADMIN — Medication 40 MILLIGRAM(S): at 07:50

## 2021-04-28 RX ADMIN — CELECOXIB 200 MILLIGRAM(S): 200 CAPSULE ORAL at 21:55

## 2021-04-28 RX ADMIN — ADENOSINE 6 MILLIGRAM(S): 3 INJECTION INTRAVENOUS at 09:16

## 2021-04-28 RX ADMIN — Medication 25 MILLIGRAM(S): at 07:50

## 2021-04-28 RX ADMIN — Medication 650 MILLIGRAM(S): at 01:15

## 2021-04-28 RX ADMIN — Medication 81 MILLIGRAM(S): at 23:58

## 2021-04-28 RX ADMIN — Medication 15 MILLIGRAM(S): at 11:35

## 2021-04-28 RX ADMIN — SODIUM CHLORIDE 100 MILLILITER(S): 9 INJECTION INTRAMUSCULAR; INTRAVENOUS; SUBCUTANEOUS at 00:40

## 2021-04-28 RX ADMIN — CHLORHEXIDINE GLUCONATE 1 APPLICATION(S): 213 SOLUTION TOPICAL at 05:59

## 2021-04-28 NOTE — OCCUPATIONAL THERAPY INITIAL EVALUATION ADULT - GENERAL OBSERVATIONS, REHAB EVAL
13:20-13:43; chart reviewed, ok to treat by Occupational Therapist as confirmed by RN, Pt received with Physical Therapist semifowler +ace wrap left knee +tele in NAD. Pt reported 8/10 pain; however, in agreement with OT IE.

## 2021-04-28 NOTE — OCCUPATIONAL THERAPY INITIAL EVALUATION ADULT - LIVES WITH, PROFILE
private home; 4 stairs to enter with bilateral handrails, 13 stairs to living area +walk-in shower with grab bar +shower chair/alone

## 2021-04-28 NOTE — PHYSICAL THERAPY INITIAL EVALUATION ADULT - PATIENT/FAMILY/SIGNIFICANT OTHER GOALS STATEMENT, PT EVAL
Patient would like to walk with rolling walker and safely negotiate stairs so that he can return home

## 2021-04-28 NOTE — PHYSICAL THERAPY INITIAL EVALUATION ADULT - ADDITIONAL COMMENTS
As per patient, resides alone in private home. 4 steps to enter with bilateral railing, 13 steps inside with (L) side railing.  Ambulating with cane prior to this surgery. Will need rolling walker for D/C.

## 2021-04-28 NOTE — PHYSICAL THERAPY INITIAL EVALUATION ADULT - LEVEL OF INDEPENDENCE: STAIR NEGOTIATION, REHAB EVAL
N/A - as discussed with patient, will perform stair assessment tomorrow AM , as patient with SVT this morning

## 2021-04-28 NOTE — OCCUPATIONAL THERAPY INITIAL EVALUATION ADULT - REHAB POTENTIAL, OT EVAL
Pt demonstrated good understanding of home safety and safe car transfers/good, to achieve stated therapy goals

## 2021-04-28 NOTE — PHYSICAL THERAPY INITIAL EVALUATION ADULT - GAIT DEVIATIONS NOTED, PT EVAL
stooped posture , decreased heel strike / push off , decreased (L) knee flexion/decreased carl/increased time in double stance/decreased step length/decreased weight-shifting ability

## 2021-04-28 NOTE — CONSULT NOTE ADULT - ASSESSMENT
INCOMPLETE CONSULT NOTE    Tachycardia to 170s 180s 2/2 SVT  -RRT was called for tachycardia; patient asymptomatic but reporting of palpitations. Vagal maneuver was tried without improvement.   -rhythm strip placed in the chart: patient was found to be in SVT  -s/p IV adenosine 6mg with immediate conversion to NSR with rate in 80-100s  PLAN  -will monitor on telemetry  -would recommend getting cariology consult  -would recommend checking TSH   -wound recommend getting ECHO 52 y/o M with PMHx of Accidental injury left knee, history of "palpitation and tachycardia" (reports he had an heart monitor in past as outpatient but does not know the diagnosis) presented for scheduled left total knee arthroplasty. Patient is now s/p  left total knee arthroplasty. Patient was seen and examined at bedside. Patient was found to be tachycardic this AM to 170-180s; he reported palpitations but denied any other complaints; RRT was called; adenosine was given with improvement in HR.     # left knee arthritis. s/p  left Total Knee Arthoplasty revision   # Tachycardia to 170s 180s 2/2 SVT  -RRT was called for tachycardia; patient asymptomatic but reporting of palpitations. Vagal maneuver was tried without improvement.   -rhythm strip placed in the chart: patient was found to be in SVT  -s/p IV adenosine 6mg with immediate conversion to NSR with rate in 80-100s  Recommendations:  -will monitor on telemetry  -would recommend getting cariology consult  -would recommend checking TSH   -wound recommend getting ECHO  -DVT ppx per primary  -PT/OT; OBB to chair  -pain control per primary  -bowel regimen  -incentive spirometer  -agree with continuing home meds  -recommend follow up with PCP in 1-2 weeks    Thank you for consulting medicine. Medicine will continue to follow. Please call [phone] with any questions.

## 2021-04-28 NOTE — PHYSICAL THERAPY INITIAL EVALUATION ADULT - IMPAIRMENTS CONTRIBUTING IMPAIRED BED MOBILITY, REHAB EVAL
decreased endurance/decreased flexibility/pain/impaired postural control/decreased ROM/decreased strength

## 2021-04-28 NOTE — PROGRESS NOTE ADULT - SUBJECTIVE AND OBJECTIVE BOX
POD#1 S/P  revision TKA  T(C): 36.4 (04-28-21 @ 09:31), Max: 37.3 (04-28-21 @ 02:11)  HR: 102 (04-28-21 @ 09:31) (69 - 103)  BP: 146/92 (04-28-21 @ 09:31) (137/92 - 182/105)  RR: 16 (04-28-21 @ 09:31) (16 - 19)  SpO2: 100% (04-27-21 @ 22:00) (100% - 100%)                        11.6   20.92 )-----------( 266      ( 28 Apr 2021 11:48 )             35.7     04-28    142  |  108  |  15  ----------------------------<  121<H>  4.0   |  25  |  1.1    Ca    9.6      28 Apr 2021 11:48    The pt knows when he is in svt   feels very comfortable at this time.  the pt has had one additional run of tachycardia after the 9am episode  converted quickly.   Dr Woods ordered cardiazem 10 mg q6h prn tachy greater than 110  at 1540  no Cardizem has been given to this point   PTS PAIN IS CONTROLLED   WOUND : AqUACEL is changed as per protocol  N/V/I  ABX COMPLETE  DVT PROPHYLAXIS IN PLACE  REHAB IN PROGRESS  D/C PLAN home to [place] when stable      POD#1 S/P  revision TKA  T(C): 36.4 (04-28-21 @ 09:31), Max: 37.3 (04-28-21 @ 02:11)  HR: 102 (04-28-21 @ 09:31) (69 - 103)  BP: 146/92 (04-28-21 @ 09:31) (137/92 - 182/105)  RR: 16 (04-28-21 @ 09:31) (16 - 19)  SpO2: 100% (04-27-21 @ 22:00) (100% - 100%)                        11.6   20.92 )-----------( 266      ( 28 Apr 2021 11:48 )             35.7     04-28    142  |  108  |  15  ----------------------------<  121<H>  4.0   |  25  |  1.1    Ca    9.6      28 Apr 2021 11:48    The pt knows when he is in svt he has been experiencing this for years, cards unable to capture on the holter monitor in the past    feels very comfortable at this time.  the pt has had one additional run of tachycardia after the 9am episode  converted quickly. with a valsalva maneuver.   Dr Woods ordered cardiazem 10 mg q6h prn tachy greater than 110  at 1540  no Cardizem has been given to this point.   PTS PAIN IS CONTROLLED   WOUND : AqUACEL is changed as per protocol  N/V/I  ABX COMPLETE  DVT PROPHYLAXIS IN PLACE  REHAB IN PROGRESS  D/C PLAN home to [place] when stable

## 2021-04-28 NOTE — PHYSICAL THERAPY INITIAL EVALUATION ADULT - GENERAL OBSERVATIONS, REHAB EVAL
As discussed with ODILIA Luo and ortho PA Snow, patient currently with heart rate of 170s at rest. Hold PT evaluation at this time, will follow up as appropriate.
13:00 - 13:25. Chart reviewed. Patient available to be seen for physical therapy, confirmed with nurse. Patient encountered already out of bed in chair, +tele, +ace wrap and SIM cold pack (L) knee. C/o pain 8/10 but is ready to walk with PT now.

## 2021-04-28 NOTE — PROGRESS NOTE ADULT - SUBJECTIVE AND OBJECTIVE BOX
ORTHO PROGRESS NOTE       51y Male POD # 1     S/P left Total Knee Arthoplasty revision     Patient seen and examined at bedside . The patient is awake and alert in NAD. No complaints of chest pain, SOB, N/V. Unable to void post op- steven was placed overnight -     PAST MEDICAL & SURGICAL HISTORY:  Left knee pain    CRPS 1, lower extremity    Marijuana user    Accidental injury  left knee    History of left knee replacement  2017    S/P arthroscopy of right knee  08/2020    H/O left knee surgery  11 times since 2016          MEDICATIONS  (STANDING):  acetaminophen   Tablet .. 650 milliGRAM(s) Oral every 6 hours  aspirin enteric coated 81 milliGRAM(s) Oral every 12 hours  ceFAZolin   IVPB 2000 milliGRAM(s) IV Intermittent every 8 hours  celecoxib 200 milliGRAM(s) Oral every 12 hours  chlorhexidine 4% Liquid 1 Application(s) Topical <User Schedule>  ferrous    sulfate 325 milliGRAM(s) Oral daily  ketorolac   Injectable 15 milliGRAM(s) IV Push every 6 hours  multivitamin 1 Tablet(s) Oral daily  pantoprazole    Tablet 40 milliGRAM(s) Oral before breakfast  polyethylene glycol 3350 17 Gram(s) Oral at bedtime  senna 2 Tablet(s) Oral at bedtime    MEDICATIONS  (PRN):  ondansetron Injectable 4 milliGRAM(s) IV Push every 6 hours PRN Nausea and/or Vomiting  oxyCODONE    IR 5 milliGRAM(s) Oral every 4 hours PRN Moderate Pain (4 - 6)  oxyCODONE    IR 10 milliGRAM(s) Oral every 4 hours PRN Severe Pain (7 - 10)  traMADol 50 milliGRAM(s) Oral every 6 hours PRN Mild Pain (1 - 3)        Vital Signs Last 24 Hrs  T(C): 36.7 (28 Apr 2021 05:42), Max: 37.3 (28 Apr 2021 02:11)  T(F): 98.1 (28 Apr 2021 05:42), Max: 99.2 (28 Apr 2021 02:11)  HR: 90 (28 Apr 2021 05:42) (66 - 103)  BP: 166/90 (28 Apr 2021 05:42) (137/92 - 182/105)  BP(mean): --  RR: 18 (28 Apr 2021 05:42) (16 - 19)  SpO2: 100% (27 Apr 2021 22:00) (100% - 100%)                          11.5   20.11 )-----------( 272      ( 28 Apr 2021 07:00 )             34.4     04-28    142  |  109  |  13  ----------------------------<  146<H>  4.3   |  25  |  1.1    Ca    9.3      28 Apr 2021 07:00            DVT ppx : aspirin         PE:  The patient was seen and examined at bedside          A&OX3, NAD          dressing C/D/I          Compartments soft         NVI, SILT           A/P:              POD #  1     s/p  left Total Knee Arthoplasty revision                      OOB to Chair            Physical Therapy-wbat           Pain control - per pain protocol            Incentive Spirometry            DVT Prophylaxis - aspirin              monitor voiding             GI ppx- continue Protonix                   Dispo: Home with [facility]   ORTHO PROGRESS NOTE       51y Male POD # 1     S/P left Total Knee Arthoplasty revision     Patient seen and examined at bedside . The patient is awake and alert in NAD. No complaints of chest pain, SOB, N/V. Unable to void post op- steven was placed overnight - states he is voiding. Will get another bladder scan. Called by PT- Gemma, upon attempting to ambulate with patient his heart rate went up to the 170's, the patient was dizzy ,  /91. The patient states he has had this happen before but does not have a diagnosis for it. Usually he puts his head between his legs and it resolves. He denies chest pain    PAST MEDICAL & SURGICAL HISTORY:  Left knee pain    CRPS 1, lower extremity    Marijuana user    Accidental injury  left knee    History of left knee replacement  2017    S/P arthroscopy of right knee  08/2020    H/O left knee surgery  11 times since 2016          MEDICATIONS  (STANDING):  acetaminophen   Tablet .. 650 milliGRAM(s) Oral every 6 hours  aspirin enteric coated 81 milliGRAM(s) Oral every 12 hours  ceFAZolin   IVPB 2000 milliGRAM(s) IV Intermittent every 8 hours  celecoxib 200 milliGRAM(s) Oral every 12 hours  chlorhexidine 4% Liquid 1 Application(s) Topical <User Schedule>  ferrous    sulfate 325 milliGRAM(s) Oral daily  ketorolac   Injectable 15 milliGRAM(s) IV Push every 6 hours  multivitamin 1 Tablet(s) Oral daily  pantoprazole    Tablet 40 milliGRAM(s) Oral before breakfast  polyethylene glycol 3350 17 Gram(s) Oral at bedtime  senna 2 Tablet(s) Oral at bedtime    MEDICATIONS  (PRN):  ondansetron Injectable 4 milliGRAM(s) IV Push every 6 hours PRN Nausea and/or Vomiting  oxyCODONE    IR 5 milliGRAM(s) Oral every 4 hours PRN Moderate Pain (4 - 6)  oxyCODONE    IR 10 milliGRAM(s) Oral every 4 hours PRN Severe Pain (7 - 10)  traMADol 50 milliGRAM(s) Oral every 6 hours PRN Mild Pain (1 - 3)        Vital Signs Last 24 Hrs  T(C): 36.7 (28 Apr 2021 05:42), Max: 37.3 (28 Apr 2021 02:11)  T(F): 98.1 (28 Apr 2021 05:42), Max: 99.2 (28 Apr 2021 02:11)  HR: 90 (28 Apr 2021 05:42) (66 - 103)  BP: 166/90 (28 Apr 2021 05:42) (137/92 - 182/105)  BP(mean): --  RR: 18 (28 Apr 2021 05:42) (16 - 19)  SpO2: 100% (27 Apr 2021 22:00) (100% - 100%)                          11.5   20.11 )-----------( 272      ( 28 Apr 2021 07:00 )             34.4     04-28    142  |  109  |  13  ----------------------------<  146<H>  4.3   |  25  |  1.1    Ca    9.3      28 Apr 2021 07:00            DVT ppx : aspirin         PE:  The patient was seen and examined at bedside          A&OX3, NAD          dressing C/D/I          Compartments soft         NVI, SILT           A/P:              POD #  1     s/p  left Total Knee Arthoplasty revision with tachycardia , elevated BP.     Discussed with hospitalist Dr. Woods, will put patient on telemetry, obtain EKG and order TSH. And obtain Cardiology consult. - Rapid response called by Dr. Woods as HR remains in the 170's                      OOB to Chair            Physical Therapy-wbat           Pain control - per pain protocol            Incentive Spirometry            DVT Prophylaxis - aspirin              monitor voiding             GI ppx- continue Protonix                   Dispo: Home with [facility]

## 2021-04-29 LAB
ANION GAP SERPL CALC-SCNC: 7 MMOL/L — SIGNIFICANT CHANGE UP (ref 7–14)
BUN SERPL-MCNC: 18 MG/DL — SIGNIFICANT CHANGE UP (ref 10–20)
CALCIUM SERPL-MCNC: 9.3 MG/DL — SIGNIFICANT CHANGE UP (ref 8.5–10.1)
CHLORIDE SERPL-SCNC: 106 MMOL/L — SIGNIFICANT CHANGE UP (ref 98–110)
CO2 SERPL-SCNC: 28 MMOL/L — SIGNIFICANT CHANGE UP (ref 17–32)
CREAT SERPL-MCNC: 1 MG/DL — SIGNIFICANT CHANGE UP (ref 0.7–1.5)
GLUCOSE SERPL-MCNC: 105 MG/DL — HIGH (ref 70–99)
HCT VFR BLD CALC: 32.2 % — LOW (ref 42–52)
HGB BLD-MCNC: 10.5 G/DL — LOW (ref 14–18)
MCHC RBC-ENTMCNC: 32.2 PG — HIGH (ref 27–31)
MCHC RBC-ENTMCNC: 32.6 G/DL — SIGNIFICANT CHANGE UP (ref 32–37)
MCV RBC AUTO: 98.8 FL — HIGH (ref 80–94)
NRBC # BLD: 0 /100 WBCS — SIGNIFICANT CHANGE UP (ref 0–0)
PLATELET # BLD AUTO: 267 K/UL — SIGNIFICANT CHANGE UP (ref 130–400)
POTASSIUM SERPL-MCNC: 3.9 MMOL/L — SIGNIFICANT CHANGE UP (ref 3.5–5)
POTASSIUM SERPL-SCNC: 3.9 MMOL/L — SIGNIFICANT CHANGE UP (ref 3.5–5)
RBC # BLD: 3.26 M/UL — LOW (ref 4.7–6.1)
RBC # FLD: 12.3 % — SIGNIFICANT CHANGE UP (ref 11.5–14.5)
SODIUM SERPL-SCNC: 141 MMOL/L — SIGNIFICANT CHANGE UP (ref 135–146)
TSH SERPL-MCNC: 0.36 UIU/ML — SIGNIFICANT CHANGE UP (ref 0.27–4.2)
WBC # BLD: 10.25 K/UL — SIGNIFICANT CHANGE UP (ref 4.8–10.8)
WBC # FLD AUTO: 10.25 K/UL — SIGNIFICANT CHANGE UP (ref 4.8–10.8)

## 2021-04-29 PROCEDURE — 99231 SBSQ HOSP IP/OBS SF/LOW 25: CPT

## 2021-04-29 PROCEDURE — 73560 X-RAY EXAM OF KNEE 1 OR 2: CPT | Mod: 26,LT

## 2021-04-29 RX ORDER — METOPROLOL TARTRATE 50 MG
25 TABLET ORAL DAILY
Refills: 0 | Status: DISCONTINUED | OUTPATIENT
Start: 2021-04-29 | End: 2021-04-30

## 2021-04-29 RX ADMIN — Medication 650 MILLIGRAM(S): at 06:40

## 2021-04-29 RX ADMIN — Medication 325 MILLIGRAM(S): at 12:02

## 2021-04-29 RX ADMIN — OXYCODONE HYDROCHLORIDE 5 MILLIGRAM(S): 5 TABLET ORAL at 22:29

## 2021-04-29 RX ADMIN — Medication 1 TABLET(S): at 12:02

## 2021-04-29 RX ADMIN — OXYCODONE HYDROCHLORIDE 5 MILLIGRAM(S): 5 TABLET ORAL at 00:34

## 2021-04-29 RX ADMIN — TRAMADOL HYDROCHLORIDE 50 MILLIGRAM(S): 50 TABLET ORAL at 18:22

## 2021-04-29 RX ADMIN — PANTOPRAZOLE SODIUM 40 MILLIGRAM(S): 20 TABLET, DELAYED RELEASE ORAL at 05:56

## 2021-04-29 RX ADMIN — Medication 650 MILLIGRAM(S): at 00:35

## 2021-04-29 RX ADMIN — Medication 81 MILLIGRAM(S): at 10:37

## 2021-04-29 RX ADMIN — Medication 81 MILLIGRAM(S): at 22:31

## 2021-04-29 RX ADMIN — Medication 650 MILLIGRAM(S): at 12:02

## 2021-04-29 RX ADMIN — Medication 650 MILLIGRAM(S): at 05:56

## 2021-04-29 RX ADMIN — Medication 650 MILLIGRAM(S): at 18:20

## 2021-04-29 RX ADMIN — CELECOXIB 200 MILLIGRAM(S): 200 CAPSULE ORAL at 10:37

## 2021-04-29 RX ADMIN — Medication 25 MILLIGRAM(S): at 12:03

## 2021-04-29 RX ADMIN — CELECOXIB 200 MILLIGRAM(S): 200 CAPSULE ORAL at 22:29

## 2021-04-29 RX ADMIN — Medication 650 MILLIGRAM(S): at 22:31

## 2021-04-29 NOTE — CHART NOTE - NSCHARTNOTESELECT_GEN_ALL_CORE
Event Note
ortho/Event Note
Anesthesia PACU Note/Event Note
Event Note
Ortho/Event Note
Transfer Note

## 2021-04-29 NOTE — PROGRESS NOTE ADULT - SUBJECTIVE AND OBJECTIVE BOX
ORTHO PROGRESS NOTE       51y Male POD #  2    S/P left Total Knee Arthoplasty revision      Patient seen and examined at bedside . The patient is awake and alert in NAD. No complaints of chest pain, SOB, N/V. Abel states he feels great today.   Yesterday was in SVT- RR called given 6mg adenosine IVP x1  and he converted to nsr. In the early afternoon developed again but was converted with valsalva. There have been no occurrences since.     PAST MEDICAL & SURGICAL HISTORY:  Left knee pain    CRPS 1, lower extremity    Marijuana user    Accidental injury  left knee    History of left knee replacement  2017    S/P arthroscopy of right knee  08/2020    H/O left knee surgery  11 times since 2016          MEDICATIONS  (STANDING):  acetaminophen   Tablet .. 650 milliGRAM(s) Oral every 6 hours  aspirin enteric coated 81 milliGRAM(s) Oral every 12 hours  celecoxib 200 milliGRAM(s) Oral every 12 hours  chlorhexidine 4% Liquid 1 Application(s) Topical <User Schedule>  ferrous    sulfate 325 milliGRAM(s) Oral daily  multivitamin 1 Tablet(s) Oral daily  pantoprazole    Tablet 40 milliGRAM(s) Oral before breakfast  polyethylene glycol 3350 17 Gram(s) Oral at bedtime  senna 2 Tablet(s) Oral at bedtime    MEDICATIONS  (PRN):  bisacodyl Suppository 10 milliGRAM(s) Rectal once PRN Constipation  diltiazem Injectable 10 milliGRAM(s) IV Push every 6 hours PRN tachycardia > 110; SVT  ondansetron Injectable 4 milliGRAM(s) IV Push every 6 hours PRN Nausea and/or Vomiting  oxyCODONE    IR 5 milliGRAM(s) Oral every 4 hours PRN Moderate Pain (4 - 6)  oxyCODONE    IR 10 milliGRAM(s) Oral every 4 hours PRN Severe Pain (7 - 10)  traMADol 50 milliGRAM(s) Oral every 6 hours PRN Mild Pain (1 - 3)        Vital Signs Last 24 Hrs  T(C): 36.6 (29 Apr 2021 06:01), Max: 36.6 (28 Apr 2021 17:30)  T(F): 97.8 (29 Apr 2021 06:01), Max: 97.9 (28 Apr 2021 21:30)  HR: 78 (29 Apr 2021 06:01) (74 - 102)  BP: 129/96 (29 Apr 2021 06:01) (129/96 - 171/95)  BP(mean): --  RR: 16 (29 Apr 2021 06:01) (16 - 16)  SpO2: --                          11.6   20.92 )-----------( 266      ( 28 Apr 2021 11:48 )             35.7     04-28    142  |  108  |  15  ----------------------------<  121<H>  4.0   |  25  |  1.1    Ca    9.6      28 Apr 2021 11:48            DVT ppx : aspirin         PE:  The patient was seen and examined at bedside          A&OX3, NAD          dressing- aquacel with mild spotting - dsg was changed yesterday         Compartments soft         NVI, SILT           A/P:              POD #  2     s/p   left Total Knee Arthoplasty revision  - post op SVT, no overnight events                   OOB to Chair            Physical Therapy-wbat           Pain control - per pain protocol            Incentive Spirometry            DVT Prophylaxis - aspirin             f/u am labs              cardio consult pending                -f/u echo results             GI ppx- continue Protonix                   Dispo: planning for home    ORTHO PROGRESS NOTE       51y Male POD #  2    S/P left Total Knee Arthoplasty revision      Patient seen and examined at bedside . The patient is awake and alert in NAD. No complaints of chest pain, SOB, N/V. Abel states he feels great today.   Yesterday was in SVT- RR called given 6mg adenosine IVP x1  and he converted to nsr. In the early afternoon developed again but was converted with valsalva. There have been no occurrences since.     PAST MEDICAL & SURGICAL HISTORY:  Left knee pain    CRPS 1, lower extremity    Marijuana user    Accidental injury  left knee    History of left knee replacement  2017    S/P arthroscopy of right knee  08/2020    H/O left knee surgery  11 times since 2016          MEDICATIONS  (STANDING):  acetaminophen   Tablet .. 650 milliGRAM(s) Oral every 6 hours  aspirin enteric coated 81 milliGRAM(s) Oral every 12 hours  celecoxib 200 milliGRAM(s) Oral every 12 hours  chlorhexidine 4% Liquid 1 Application(s) Topical <User Schedule>  ferrous    sulfate 325 milliGRAM(s) Oral daily  multivitamin 1 Tablet(s) Oral daily  pantoprazole    Tablet 40 milliGRAM(s) Oral before breakfast  polyethylene glycol 3350 17 Gram(s) Oral at bedtime  senna 2 Tablet(s) Oral at bedtime    MEDICATIONS  (PRN):  bisacodyl Suppository 10 milliGRAM(s) Rectal once PRN Constipation  diltiazem Injectable 10 milliGRAM(s) IV Push every 6 hours PRN tachycardia > 110; SVT  ondansetron Injectable 4 milliGRAM(s) IV Push every 6 hours PRN Nausea and/or Vomiting  oxyCODONE    IR 5 milliGRAM(s) Oral every 4 hours PRN Moderate Pain (4 - 6)  oxyCODONE    IR 10 milliGRAM(s) Oral every 4 hours PRN Severe Pain (7 - 10)  traMADol 50 milliGRAM(s) Oral every 6 hours PRN Mild Pain (1 - 3)        Vital Signs Last 24 Hrs  T(C): 36.6 (29 Apr 2021 06:01), Max: 36.6 (28 Apr 2021 17:30)  T(F): 97.8 (29 Apr 2021 06:01), Max: 97.9 (28 Apr 2021 21:30)  HR: 78 (29 Apr 2021 06:01) (74 - 102)  BP: 129/96 (29 Apr 2021 06:01) (129/96 - 171/95)  BP(mean): --  RR: 16 (29 Apr 2021 06:01) (16 - 16)  SpO2: --                          11.6   20.92 )-----------( 266      ( 28 Apr 2021 11:48 )             35.7     04-28    142  |  108  |  15  ----------------------------<  121<H>  4.0   |  25  |  1.1    Ca    9.6      28 Apr 2021 11:48      Thyroid Stimulating Hormone, Serum: 0.36 uIU/mL (04.28.21 @ 11:48)       DVT ppx : aspirin         PE:  The patient was seen and examined at bedside          A&OX3, NAD          dressing- aquacel with mild spotting - dsg was changed yesterday         Compartments soft         NVI, SILT           A/P:              POD #  2     s/p   left Total Knee Arthoplasty revision  - post op SVT, no overnight events                   OOB to Chair            Physical Therapy-wbat           Pain control - per pain protocol            Incentive Spirometry            DVT Prophylaxis - aspirin             f/u am labs              cardio consult pending                -f/u echo results             GI ppx- continue Protonix                   Dispo: planning for home

## 2021-04-29 NOTE — CHART NOTE - NSCHARTNOTEFT_GEN_A_CORE
Epidural catheter removed in PACU with tip intact. Pt tolerated procedure well.
PACU ANESTHESIA ADMISSION NOTE      Procedure: Left total knee replacement  revision      Post op diagnosis:  Failed total knee, left        ____  Intubated  TV:______       Rate: ______      FiO2: ______    __x__  Patent Airway    __x__  Full return of protective reflexes    __x__  Full recovery from anesthesia / back to baseline     Vitals:   T:  98         R:    16              BP:       160/88           Sat:     99              P: 64      Mental Status:  __x__ Awake   _____ Alert   _____ Drowsy   _____ Sedated    Nausea/Vomiting:  __x__ NO  ______Yes,   See Post - Op Orders          Pain Scale (0-10):  __0___    Treatment: ____ None    ___x_ See Post - Op/PCA Orders    Post - Operative Fluids:   ____ Oral   ___x_ See Post - Op Orders    Plan: Discharge:   ____Home       _0____Floor     _____Critical Care    _____  Other:_________________    Comments:
PT attempted to work with patient this am -he complained of his heart racing- vitals obtained and heart rate was 170's, /91. He did not have any chest pain or sob, c/o some dizziness. Spoke with the Hospitalist Dr. Woods, rapid response was called - SVT noted on monitor adenosine 6mg ivp x1 given and he converted to nsr. Patient placed on telemetry, echo and cardio consult ordered, new labs ordered including tsh.    Spoke with Dr. Gastelum - he is aware
Spoke to patient regarding his ECHO report; EF wnl, mild hypertrophy. Patient reports that he has a cardiologist that he follows with.  -low dose BB was started for SVT  -would agree with monitoring overnight on telemetry and on BB  -if rate improved in AM, patient may be discharged home with outpatient follow up with cardiologist/ EP
At approximately 9 am the patient was ambulating with PT and went into SVT again. Heart rate was in the 170's 's/100. Sat patient in chair and he converted back to NSR . Discussed with hospitalist and cardiologist. Awaiting recommendations.
called by rn to evaluate left upper thigh pain   pt seen at bedside, resting comfortably.  Pt reports left upper thigh pain since surgery which has been about the same in intensity  pain mostly about the lateral thigh wear tourniquet was placed, thigh is soft and compressible.  No erythema or warmth.  No pain with log roll   calf soft non tender   pain most likely due top tourniquet.  Will apply ice to upper thigh and continue to monitor

## 2021-04-29 NOTE — PROGRESS NOTE ADULT - SUBJECTIVE AND OBJECTIVE BOX
MAX MCGOWAN  51y  Male      Patient is a 51y old  Male who presents with a chief complaint of left Total Knee Arthoplasty revision (29 Apr 2021 07:41)      INTERVAL HPI/OVERNIGHT EVENTS:  Patient seen and examined earlier this morning. Patient denies any worsening of symptoms; does report that earlier this AM he felt his heart racing again. Denies SOB, CP, N/V, abd pain.       REVIEW OF SYSTEMS:  CONSTITUTIONAL: No fever, weight loss, or fatigue  EYES: No eye pain, visual disturbances, or discharge  ENMT:  No difficulty hearing, tinnitus, vertigo; No sinus or throat pain  NECK: No pain or stiffness  RESPIRATORY: No cough, wheezing, chills or hemoptysis; No shortness of breath  CARDIOVASCULAR: No chest pain, palpitations, dizziness, or leg swelling  GASTROINTESTINAL: No abdominal or epigastric pain. No nausea, vomiting, or hematemesis; No diarrhea or constipation.  GENITOURINARY: No dysuria, frequency, hematuria, or incontinence  NEUROLOGICAL: No headaches, memory loss, loss of strength, numbness, or tremors  MUSCULOSKELETAL: left knee pain controlled       T(C): 36.6 (04-29-21 @ 06:01), Max: 36.6 (04-28-21 @ 17:30)  HR: 78 (04-29-21 @ 06:01) (74 - 98)  BP: 129/96 (04-29-21 @ 06:01) (129/96 - 171/95)  RR: 16 (04-29-21 @ 06:01) (16 - 16)  SpO2: --    PHYSICAL EXAM:  GENERAL: NAD, well-groomed, well-developed  HEAD:  Atraumatic, Normocephalic  EYES: EOMI, PERRLA, conjunctiva and sclera clear  ENMT: No tonsillar erythema, exudates, or enlargement; Moist mucous membranes  NECK: Supple, No JVD, Normal thyroid  NERVOUS SYSTEM:  Alert & Oriented X3, Good concentration; Motor Strength 5/5 B/L upper and lower extremities  CHEST/LUNG: Clear to percussion bilaterally; No rales, rhonchi, wheezing, or rubs  HEART: Regular rate and rhythm; No murmurs, rubs, or gallops  ABDOMEN: Soft, Nontender, Nondistended; Bowel sounds present  EXTREMITIES:  left knee dressing intact, ROM slightly limited due to pain    Consultant(s) Notes Reviewed:  [x ] YES  [ ] NO  Care Discussed with Consultants/Other Providers [ x] YES  [ ] NO    LAB:                        11.6   20.92 )-----------( 266      ( 28 Apr 2021 11:48 )             35.7     04-28    142  |  108  |  15  ----------------------------<  121<H>  4.0   |  25  |  1.1    Ca    9.6      28 Apr 2021 11:48                    Drug Dosing Weight  Height (cm): 170.2 (27 Apr 2021 22:00)  Weight (kg): 90.8 (27 Apr 2021 22:00)  BMI (kg/m2): 31.3 (27 Apr 2021 22:00)  BSA (m2): 2.02 (27 Apr 2021 22:00)    CAPILLARY BLOOD GLUCOSE        I&O's Summary    28 Apr 2021 07:01  -  29 Apr 2021 07:00  --------------------------------------------------------  IN: 0 mL / OUT: 600 mL / NET: -600 mL          RADIOLOGY & ADDITIONAL TESTS:  Imaging Personally Reviewed:  [x] YES  [ ] NO    HEALTH ISSUES - PROBLEM Dx:          MEDS:  acetaminophen   Tablet .. 650 milliGRAM(s) Oral every 6 hours  aspirin enteric coated 81 milliGRAM(s) Oral every 12 hours  bisacodyl Suppository 10 milliGRAM(s) Rectal once PRN  celecoxib 200 milliGRAM(s) Oral every 12 hours  chlorhexidine 4% Liquid 1 Application(s) Topical <User Schedule>  diltiazem Injectable 10 milliGRAM(s) IV Push every 6 hours PRN  ferrous    sulfate 325 milliGRAM(s) Oral daily  multivitamin 1 Tablet(s) Oral daily  ondansetron Injectable 4 milliGRAM(s) IV Push every 6 hours PRN  oxyCODONE    IR 5 milliGRAM(s) Oral every 4 hours PRN  oxyCODONE    IR 10 milliGRAM(s) Oral every 4 hours PRN  pantoprazole    Tablet 40 milliGRAM(s) Oral before breakfast  polyethylene glycol 3350 17 Gram(s) Oral at bedtime  senna 2 Tablet(s) Oral at bedtime  traMADol 50 milliGRAM(s) Oral every 6 hours PRN

## 2021-04-29 NOTE — CONSULT NOTE ADULT - SUBJECTIVE AND OBJECTIVE BOX
CARDIOLOGY CONSULT NOTE     CHIEF COMPLAINT/REASON FOR CONSULT:    HPI: Patient had l total knee revision. He has hx svt. He in hosp had svt. No chest pain      PAST MEDICAL & SURGICAL HISTORY:  Left knee pain    CRPS 1, lower extremity    Marijuana user    Accidental injury  left knee    History of left knee replacement  2017    S/P arthroscopy of right knee  08/2020    H/O left knee surgery  11 times since 2016        Cardiac Risks:   [ ]HTN, [ ] DM, [ ] Smoking, [ ] FH,  [ ] Lipids        MEDICATIONS:  MEDICATIONS  (STANDING):  acetaminophen   Tablet .. 650 milliGRAM(s) Oral every 6 hours  aspirin enteric coated 81 milliGRAM(s) Oral every 12 hours  celecoxib 200 milliGRAM(s) Oral every 12 hours  chlorhexidine 4% Liquid 1 Application(s) Topical <User Schedule>  ferrous    sulfate 325 milliGRAM(s) Oral daily  multivitamin 1 Tablet(s) Oral daily  pantoprazole    Tablet 40 milliGRAM(s) Oral before breakfast  polyethylene glycol 3350 17 Gram(s) Oral at bedtime  senna 2 Tablet(s) Oral at bedtime      FAMILY HISTORY:  No known problems        SOCIAL HISTORY:      [ ] Marital status seperated  Allergies    penicillin (Unknown)  pork (Rash)        	    REVIEW OF SYSTEMS:  CONSTITUTIONAL: No fever, weight loss, or fatigue  EYES: No eye pain, visual disturbances, or discharge  ENMT:  No difficulty hearing, tinnitus, vertigo; No sinus or throat pain  NECK: No pain or stiffness  RESPIRATORY: No cough, wheezing, chills or hemoptysis; No Shortness of Breath  CARDIOVASCULAR: See above  GASTROINTESTINAL: No abdominal or epigastric pain. No nausea, vomiting, or hematemesis; No diarrhea or constipation. No melena or hematochezia.  GENITOURINARY: No dysuria, frequency, hematuria, or incontinence  NEUROLOGICAL: No headaches, memory loss, loss of strength, numbness, or tremors  SKIN: No itching, burning, rashes, or lesions       PHYSICAL EXAM:  T(C): 36.6 (04-29-21 @ 06:01), Max: 36.6 (04-28-21 @ 17:30)  HR: 93 (04-29-21 @ 10:39) (74 - 98)  BP: 141/91 (04-29-21 @ 10:39) (129/96 - 171/95)  RR: 16 (04-29-21 @ 06:01) (16 - 16)  SpO2: --  Wt(kg): --  I&O's Summary    28 Apr 2021 07:01  -  29 Apr 2021 07:00  --------------------------------------------------------  IN: 0 mL / OUT: 600 mL / NET: -600 mL        Appearance: Normal	  Psychiatry: A & O x 3, Mood & affect appropriate  HEENT:   Normal oral mucosa, PERRL, EOMI	  Lymphatic: No lymphadenopathy  Cardiovascular: Normal S1 S2,RRR, No JVD, No murmurs  Respiratory: Lungs clear to auscultation	  Gastrointestinal:  Soft, Non-tender, + BS	  Skin: No rashes, No ecchymoses, No cyanosis	  Neurologic: Non-focal  Extremities: Normal range of motion, No clubbing, cyanosis or edema  Vascular: Peripheral pulses palpable 2+ bilaterally      ECG:  	< from: 12 Lead ECG (04.28.21 @ 09:20) >    Diagnosis Line Sinus tachycardia  Possible Left atrial enlargement  Left ventricular hypertrophy with repolarization abnormality  Abnormal ECG    Confirmed by DIMITRIS PACE MD ([id number]) on 4/28/2021 3:08:05 PM    < end of copied text >      	  LABS:	 	    CARDIAC MARKERS:                                    11.6   20.92 )-----------( 266      ( 28 Apr 2021 11:48 )             35.7     04-28    142  |  108  |  15  ----------------------------<  121<H>  4.0   |  25  |  1.1    Ca    9.6      28 Apr 2021 11:48          TSH: Thyroid Stimulating Hormone, Serum: 0.36 uIU/mL (04-28 @ 11:48)                
  MAX MCGOWAN  51y  Male      Patient is a 51y old  Male who presents with a chief complaint of left Total Knee Arthoplasty revision (28 Apr 2021 08:14)     50 y/o M with PMHx of Accidental injury left knee, history of "palpitation and tachycardia" (reports he had an heart monitor in past as outpatient but does not know the diagnosis) presented for scheduled left total knee arthroplasty. Patient reports he has been having left knee pain for some time and that it has been affecting his ambulation. Patient is now s/p  left total knee arthroplasty. Patient was seen and examined at bedside. Patient was found to be tachycardic this AM to 170-180s; he reported palpitations but denied any other complaints; RRT was called; adenosine was given with improvement in HR.      INTERVAL HPI/OVERNIGHT EVENTS:  Patient seen and examined earlier this morning. Patient       REVIEW OF SYSTEMS:  CONSTITUTIONAL: No fever, weight loss, or fatigue  EYES: No eye pain, visual disturbances, or discharge  ENMT:  No difficulty hearing, tinnitus, vertigo; No sinus or throat pain  NECK: No pain or stiffness  RESPIRATORY: No cough, wheezing, chills or hemoptysis; No shortness of breath  CARDIOVASCULAR: No chest pain, palpitations, dizziness, or leg swelling  GASTROINTESTINAL: No abdominal or epigastric pain. No nausea, vomiting, or hematemesis; No diarrhea or constipation.  GENITOURINARY: No dysuria, frequency, hematuria, or incontinence  NEUROLOGICAL: No headaches, memory loss, loss of strength, numbness, or tremors  MUSCULOSKELETAL: left knee pain controlled       T(C): 36.4 (04-28-21 @ 09:31), Max: 37.3 (04-28-21 @ 02:11)  HR: 102 (04-28-21 @ 09:31) (66 - 103)  BP: 146/92 (04-28-21 @ 09:31) (137/92 - 182/105)  RR: 16 (04-28-21 @ 09:31) (16 - 19)  SpO2: 100% (04-27-21 @ 22:00) (100% - 100%)    PHYSICAL EXAM:  GENERAL: NAD, well-groomed, well-developed  HEAD:  Atraumatic, Normocephalic  EYES: EOMI, PERRLA, conjunctiva and sclera clear  ENMT: No tonsillar erythema, exudates, or enlargement; Moist mucous membranes  NECK: Supple, No JVD, Normal thyroid  NERVOUS SYSTEM:  Alert & Oriented X3, Good concentration; Motor Strength 5/5 B/L upper and lower extremities  CHEST/LUNG: Clear to percussion bilaterally; No rales, rhonchi, wheezing, or rubs  HEART: Regular rate and rhythm; No murmurs, rubs, or gallops  ABDOMEN: Soft, Nontender, Nondistended; Bowel sounds present  EXTREMITIES:  left knee dressing intact, ROM slightly limited due to pain      Consultant(s) Notes Reviewed:  [x ] YES  [ ] NO  Care Discussed with Consultants/Other Providers [ x] YES  [ ] NO    LAB:                        11.5   20.11 )-----------( 272      ( 28 Apr 2021 07:00 )             34.4     04-28    142  |  109  |  13  ----------------------------<  146<H>  4.3   |  25  |  1.1    Ca    9.3      28 Apr 2021 07:00                  Culture - Fungal, Body Fluid (collected 04-27-21 @ 15:34)  Source: .Body Fluid joint fluid left knee #4  Preliminary Report (04-28-21 @ 09:01):    Testing in progress    Culture - Body Fluid with Gram Stain (collected 04-27-21 @ 15:34)  Source: .Body Fluid joint fluid left knee #4  Gram Stain (04-27-21 @ 23:44):    polymorphonuclear leukocytes seen    Red blood cells seen    No organisms seen by cytocentrifuge    Culture - Fungal, Body Fluid (collected 04-27-21 @ 15:32)  Source: .Body Fluid joint fluid left knee #3  Preliminary Report (04-28-21 @ 09:01):    Testing in progress    Culture - Body Fluid with Gram Stain (collected 04-27-21 @ 15:32)  Source: .Body Fluid joint fluid left knee #3  Gram Stain (04-27-21 @ 23:42):    No polymorphonuclear cells seen    No organisms seen by cytocentrifuge    Culture - Body Fluid with Gram Stain (collected 04-27-21 @ 15:31)  Source: .Body Fluid joint fluid left knee #2  Gram Stain (04-27-21 @ 23:41):    No polymorphonuclear cells seen    No organisms seen by cytocentrifuge    Culture - Fungal, Body Fluid (collected 04-27-21 @ 15:31)  Source: .Body Fluid joint fluid left knee #2  Preliminary Report (04-28-21 @ 09:01):    Testing in progress    Culture - Body Fluid with Gram Stain (collected 04-27-21 @ 15:30)  Source: .Body Fluid joint fluid left knee #1  Gram Stain (04-27-21 @ 23:43):    No polymorphonuclear cells seen    No organisms seen by cytocentrifuge    Culture - Fungal, Body Fluid (collected 04-27-21 @ 15:30)  Source: .Body Fluid joint fluid left knee #1  Preliminary Report (04-28-21 @ 09:01):    Testing in progress        Culture - Fungal, Body Fluid (collected 04-27-21 @ 15:34)  Source: .Body Fluid joint fluid left knee #4  Preliminary Report (04-28-21 @ 09:01):    Testing in progress    Culture - Body Fluid with Gram Stain (collected 04-27-21 @ 15:34)  Source: .Body Fluid joint fluid left knee #4  Gram Stain (04-27-21 @ 23:44):    polymorphonuclear leukocytes seen    Red blood cells seen    No organisms seen by cytocentrifuge    Culture - Fungal, Body Fluid (collected 04-27-21 @ 15:32)  Source: .Body Fluid joint fluid left knee #3  Preliminary Report (04-28-21 @ 09:01):    Testing in progress    Culture - Body Fluid with Gram Stain (collected 04-27-21 @ 15:32)  Source: .Body Fluid joint fluid left knee #3  Gram Stain (04-27-21 @ 23:42):    No polymorphonuclear cells seen    No organisms seen by cytocentrifuge    Culture - Body Fluid with Gram Stain (collected 04-27-21 @ 15:31)  Source: .Body Fluid joint fluid left knee #2  Gram Stain (04-27-21 @ 23:41):    No polymorphonuclear cells seen    No organisms seen by cytocentrifuge    Culture - Fungal, Body Fluid (collected 04-27-21 @ 15:31)  Source: .Body Fluid joint fluid left knee #2  Preliminary Report (04-28-21 @ 09:01):    Testing in progress    Culture - Body Fluid with Gram Stain (collected 04-27-21 @ 15:30)  Source: .Body Fluid joint fluid left knee #1  Gram Stain (04-27-21 @ 23:43):    No polymorphonuclear cells seen    No organisms seen by cytocentrifuge    Culture - Fungal, Body Fluid (collected 04-27-21 @ 15:30)  Source: .Body Fluid joint fluid left knee #1  Preliminary Report (04-28-21 @ 09:01):    Testing in progress      Drug Dosing Weight  Height (cm): 170.2 (27 Apr 2021 22:00)  Weight (kg): 90.8 (27 Apr 2021 22:00)  BMI (kg/m2): 31.3 (27 Apr 2021 22:00)  BSA (m2): 2.02 (27 Apr 2021 22:00)  Height (cm): 170.2 (04-27-21 @ 22:00)  Weight (kg): 90.8 (04-27-21 @ 22:00)  BMI (kg/m2): 31.3 (04-27-21 @ 22:00)  BSA (m2): 2.02 (04-27-21 @ 22:00)  CAPILLARY BLOOD GLUCOSE      POCT Blood Glucose.: 243 mg/dL (28 Apr 2021 09:18)  POCT Blood Glucose.: 140 mg/dL (27 Apr 2021 20:16)    I&O's Summary    27 Apr 2021 07:01  -  28 Apr 2021 07:00  --------------------------------------------------------  IN: 0 mL / OUT: 1450 mL / NET: -1450 mL    28 Apr 2021 07:01  -  28 Apr 2021 12:08  --------------------------------------------------------  IN: 0 mL / OUT: 600 mL / NET: -600 mL          RADIOLOGY & ADDITIONAL TESTS:  Imaging Personally Reviewed:  [x] YES  [ ] NO    HEALTH ISSUES - PROBLEM Dx:          MEDS:  acetaminophen   Tablet .. 650 milliGRAM(s) Oral every 6 hours  aspirin enteric coated 81 milliGRAM(s) Oral every 12 hours  celecoxib 200 milliGRAM(s) Oral every 12 hours  chlorhexidine 4% Liquid 1 Application(s) Topical <User Schedule>  ferrous    sulfate 325 milliGRAM(s) Oral daily  ketorolac   Injectable 15 milliGRAM(s) IV Push every 6 hours  multivitamin 1 Tablet(s) Oral daily  ondansetron Injectable 4 milliGRAM(s) IV Push every 6 hours PRN  oxyCODONE    IR 5 milliGRAM(s) Oral every 4 hours PRN  oxyCODONE    IR 10 milliGRAM(s) Oral every 4 hours PRN  pantoprazole    Tablet 40 milliGRAM(s) Oral before breakfast  polyethylene glycol 3350 17 Gram(s) Oral at bedtime  senna 2 Tablet(s) Oral at bedtime  traMADol 50 milliGRAM(s) Oral every 6 hours PRN

## 2021-04-29 NOTE — PROGRESS NOTE ADULT - REASON FOR ADMISSION
left Total Knee Arthoplasty revision
left Total Knee Arthoplasty revision
elective revision Total Knee Arthroplasty

## 2021-04-30 ENCOUNTER — TRANSCRIPTION ENCOUNTER (OUTPATIENT)
Age: 51
End: 2021-04-30

## 2021-04-30 VITALS
SYSTOLIC BLOOD PRESSURE: 110 MMHG | DIASTOLIC BLOOD PRESSURE: 86 MMHG | RESPIRATION RATE: 16 BRPM | HEART RATE: 97 BPM | TEMPERATURE: 98 F

## 2021-04-30 PROCEDURE — 99231 SBSQ HOSP IP/OBS SF/LOW 25: CPT

## 2021-04-30 RX ORDER — CELECOXIB 200 MG/1
1 CAPSULE ORAL
Qty: 14 | Refills: 0
Start: 2021-04-30 | End: 2021-05-13

## 2021-04-30 RX ORDER — TRAMADOL HYDROCHLORIDE 50 MG/1
1 TABLET ORAL
Qty: 0 | Refills: 0 | DISCHARGE

## 2021-04-30 RX ORDER — PANTOPRAZOLE SODIUM 20 MG/1
1 TABLET, DELAYED RELEASE ORAL
Qty: 42 | Refills: 0
Start: 2021-04-30 | End: 2021-06-10

## 2021-04-30 RX ORDER — METOPROLOL TARTRATE 50 MG
1 TABLET ORAL
Qty: 30 | Refills: 0
Start: 2021-04-30 | End: 2021-05-29

## 2021-04-30 RX ORDER — TRAMADOL HYDROCHLORIDE 50 MG/1
1 TABLET ORAL
Qty: 20 | Refills: 0
Start: 2021-04-30 | End: 2021-05-04

## 2021-04-30 RX ORDER — ASPIRIN/CALCIUM CARB/MAGNESIUM 324 MG
1 TABLET ORAL
Qty: 84 | Refills: 0
Start: 2021-04-30 | End: 2021-06-10

## 2021-04-30 RX ORDER — ACETAMINOPHEN 500 MG
2 TABLET ORAL
Qty: 112 | Refills: 0
Start: 2021-04-30 | End: 2021-05-13

## 2021-04-30 RX ORDER — OXYCODONE HYDROCHLORIDE 5 MG/1
1 TABLET ORAL
Qty: 28 | Refills: 0
Start: 2021-04-30 | End: 2021-05-06

## 2021-04-30 RX ADMIN — PANTOPRAZOLE SODIUM 40 MILLIGRAM(S): 20 TABLET, DELAYED RELEASE ORAL at 05:45

## 2021-04-30 RX ADMIN — Medication 650 MILLIGRAM(S): at 01:00

## 2021-04-30 RX ADMIN — OXYCODONE HYDROCHLORIDE 5 MILLIGRAM(S): 5 TABLET ORAL at 01:00

## 2021-04-30 RX ADMIN — CELECOXIB 200 MILLIGRAM(S): 200 CAPSULE ORAL at 01:00

## 2021-04-30 RX ADMIN — Medication 650 MILLIGRAM(S): at 05:45

## 2021-04-30 RX ADMIN — TRAMADOL HYDROCHLORIDE 50 MILLIGRAM(S): 50 TABLET ORAL at 06:24

## 2021-04-30 RX ADMIN — Medication 25 MILLIGRAM(S): at 05:45

## 2021-04-30 RX ADMIN — Medication 650 MILLIGRAM(S): at 06:23

## 2021-04-30 RX ADMIN — TRAMADOL HYDROCHLORIDE 50 MILLIGRAM(S): 50 TABLET ORAL at 05:45

## 2021-04-30 RX ADMIN — CELECOXIB 200 MILLIGRAM(S): 200 CAPSULE ORAL at 09:08

## 2021-04-30 NOTE — DISCHARGE NOTE PROVIDER - NSDCMRMEDTOKEN_GEN_ALL_CORE_FT
acetaminophen 325 mg oral tablet: 2 tab(s) orally every 6 hours MDD:8  aspirin 81 mg oral delayed release tablet: 1 tab(s) orally every 12 hours MDD:2  celecoxib 200 mg oral capsule: 1 cap(s) orally once a day MDD:1  metoprolol succinate 25 mg oral tablet, extended release: 1 tab(s) orally once a day MDD:1  pantoprazole 40 mg oral delayed release tablet: 1 tab(s) orally once a day (before a meal) MDD:1  Percocet 10/325 oral tablet: 1 tab(s) orally every 6 hours, As Needed  traMADol 50 mg oral tablet: 1 tab(s) orally every 6 hours, As Needed MDD:4

## 2021-04-30 NOTE — DISCHARGE NOTE NURSING/CASE MANAGEMENT/SOCIAL WORK - PATIENT PORTAL LINK FT
You can access the FollowMyHealth Patient Portal offered by [facility] by registering at the following website: [email or web address]. By joining [employer]s FollowMyHealth portal, you will also be able to view your health information using other applications (apps) compatible with our system.

## 2021-04-30 NOTE — DISCHARGE NOTE PROVIDER - NSCORESITESY/N_GEN_A_CORE_RD
You have had a steroid injection today.  For the first 2 hours there will likely be some numbing in the joint from the lidocaine.  This is a good sign, indicating that the injection is in the right place.  In 2 hours the lidocaine will wear off, and the joint will hurt like you had a shot.  Each day the cortisone makes it feel better.  It reaches peak effect in 2 weeks.  We expect it to last for 3 months.  You may resume regular activity when you feel ready.  If you are diabetic, your glucoses will be quite high for several days.    Continue antibiotics for dental work.  Return to clinic 2-4 years for left knee x-ray.  
No

## 2021-04-30 NOTE — PROGRESS NOTE ADULT - SUBJECTIVE AND OBJECTIVE BOX
ORTHOPEDIC SURGERY PROGRESS NOTE     51y Male s/p left total knee revision POD#3. Patient seen and examined this morning at bedside, doing well, pain well controlled. No overnight events of tachycardia. Patient is stable, waiting to be seen by the hospitalist this morning, if cleared by medicine patient will be discharged today. Patient worked with PT post operatively. Denies any numbness/tingling in the extremities, denies CP/SOB/N/V/D.     POD#1 pateint was in SVT- RR called given 6mg adenosine IVP x1  and he converted to nsr. In the early afternoon developed again but was converted with valsalva. There have been no occurrences since. heart rate controlled.     MEDICATIONS  (STANDING):  acetaminophen   Tablet .. 650 milliGRAM(s) Oral every 6 hours  aspirin enteric coated 81 milliGRAM(s) Oral every 12 hours  celecoxib 200 milliGRAM(s) Oral every 12 hours  chlorhexidine 4% Liquid 1 Application(s) Topical <User Schedule>  ferrous    sulfate 325 milliGRAM(s) Oral daily  metoprolol succinate ER 25 milliGRAM(s) Oral daily  multivitamin 1 Tablet(s) Oral daily  pantoprazole    Tablet 40 milliGRAM(s) Oral before breakfast  polyethylene glycol 3350 17 Gram(s) Oral at bedtime  senna 2 Tablet(s) Oral at bedtime    MEDICATIONS  (PRN):  bisacodyl Suppository 10 milliGRAM(s) Rectal once PRN Constipation  ondansetron Injectable 4 milliGRAM(s) IV Push every 6 hours PRN Nausea and/or Vomiting  oxyCODONE    IR 5 milliGRAM(s) Oral every 4 hours PRN Moderate Pain (4 - 6)  oxyCODONE    IR 10 milliGRAM(s) Oral every 4 hours PRN Severe Pain (7 - 10)  traMADol 50 milliGRAM(s) Oral every 6 hours PRN Mild Pain (1 - 3)      PE:  T(F): 97.8 (04-30-21 @ 06:09), Max: 99 (04-29-21 @ 21:00)  HR: 97 (04-30-21 @ 06:09) (84 - 97)  BP: 110/86 (04-30-21 @ 06:09) (110/86 - 141/91)  RR: 16 (04-30-21 @ 06:09) (16 - 16)  SpO2: --    Patient laying in bed, in NAD, unlabored breathing on RA     left knee: dressing in place, c/d/i  compartments soft and compressible  mild ttp at medial thigh at location of tournequet   NVI, sensation grossly intact       LABS:                        10.5   10.25 )-----------( 267      ( 29 Apr 2021 11:33 )             32.2     04-29    141  |  106  |  18  ----------------------------<  105<H>  3.9   |  28  |  1.0    Ca    9.3      29 Apr 2021 11:33        A/P: 51y Male s/p <procedure> POD # 3, complicated by episodes of SVT during hospital admission, stable with no overnight events   -wbat LLE  -Pain control  -DVT ppx  -Incentive spirometry  -PT/OT OOB  -post op antibiotics completed  -Dispo planning: home with home PT today after seen and cleared by hospitalist   -f/u echo report  -cardiology consult reccs: begin metoprolol 25mg for rate control; follow up with outpatient cardiologist   -No up with  in 2 weeks

## 2021-04-30 NOTE — PROGRESS NOTE ADULT - SUBJECTIVE AND OBJECTIVE BOX
MAX MCGOWAN  51y  Male      Patient is a 51y old  Male who presents with a chief complaint of left Total Knee Arthoplasty revision (29 Apr 2021 07:41)      INTERVAL HPI/OVERNIGHT EVENTS:  Patient seen and examined earlier this morning. Patient denies any worsening of symptoms; Denies SOB, CP, N/V, abd pain. He denies any more palpitations.       REVIEW OF SYSTEMS:  CONSTITUTIONAL: No fever, weight loss, or fatigue  EYES: No eye pain, visual disturbances, or discharge  ENMT:  No difficulty hearing, tinnitus, vertigo; No sinus or throat pain  NECK: No pain or stiffness  RESPIRATORY: No cough, wheezing, chills or hemoptysis; No shortness of breath  CARDIOVASCULAR: No chest pain, palpitations, dizziness, or leg swelling  GASTROINTESTINAL: No abdominal or epigastric pain. No nausea, vomiting, or hematemesis; No diarrhea or constipation.  GENITOURINARY: No dysuria, frequency, hematuria, or incontinence  NEUROLOGICAL: No headaches, memory loss, loss of strength, numbness, or tremors  MUSCULOSKELETAL: left knee pain controlled       Vital Signs Last 24 Hrs  T(C): 36.6 (30 Apr 2021 06:09), Max: 37.2 (29 Apr 2021 21:00)  T(F): 97.8 (30 Apr 2021 06:09), Max: 99 (29 Apr 2021 21:00)  HR: 97 (30 Apr 2021 06:09) (84 - 97)  BP: 110/86 (30 Apr 2021 06:09) (110/86 - 141/91)  BP(mean): --  RR: 16 (30 Apr 2021 06:09) (16 - 16)  SpO2: --  PHYSICAL EXAM:  GENERAL: NAD, well-groomed, well-developed  HEAD:  Atraumatic, Normocephalic  EYES: EOMI, PERRLA, conjunctiva and sclera clear  ENMT: No tonsillar erythema, exudates, or enlargement; Moist mucous membranes  NECK: Supple, No JVD, Normal thyroid  NERVOUS SYSTEM:  Alert & Oriented X3, Good concentration; Motor Strength 5/5 B/L upper and lower extremities  CHEST/LUNG: Clear to percussion bilaterally; No rales, rhonchi, wheezing, or rubs  HEART: Regular rate and rhythm; No murmurs, rubs, or gallops  ABDOMEN: Soft, Nontender, Nondistended; Bowel sounds present  EXTREMITIES:  left knee dressing intact, ROM slightly limited due to pain    Consultant(s) Notes Reviewed:  [x ] YES  [ ] NO  Care Discussed with Consultants/Other Providers [ x] YES  [ ] NO    LAB:                        10.5   10.25 )-----------( 267      ( 29 Apr 2021 11:33 )             32.2   04-29    141  |  106  |  18  ----------------------------<  105<H>  3.9   |  28  |  1.0    Ca    9.3      29 Apr 2021 11:33                    Drug Dosing Weight  Height (cm): 170.2 (27 Apr 2021 22:00)  Weight (kg): 90.8 (27 Apr 2021 22:00)  BMI (kg/m2): 31.3 (27 Apr 2021 22:00)  BSA (m2): 2.02 (27 Apr 2021 22:00)    CAPILLARY BLOOD GLUCOSE        I&O's Summary    28 Apr 2021 07:01  -  29 Apr 2021 07:00  --------------------------------------------------------  IN: 0 mL / OUT: 600 mL / NET: -600 mL          RADIOLOGY & ADDITIONAL TESTS:  Imaging Personally Reviewed:  [x] YES  [ ] NO    HEALTH ISSUES - PROBLEM Dx:          MEDICATIONS  (STANDING):  acetaminophen   Tablet .. 650 milliGRAM(s) Oral every 6 hours  aspirin enteric coated 81 milliGRAM(s) Oral every 12 hours  celecoxib 200 milliGRAM(s) Oral every 12 hours  chlorhexidine 4% Liquid 1 Application(s) Topical <User Schedule>  ferrous    sulfate 325 milliGRAM(s) Oral daily  metoprolol succinate ER 25 milliGRAM(s) Oral daily  multivitamin 1 Tablet(s) Oral daily  pantoprazole    Tablet 40 milliGRAM(s) Oral before breakfast  polyethylene glycol 3350 17 Gram(s) Oral at bedtime  senna 2 Tablet(s) Oral at bedtime    MEDICATIONS  (PRN):  bisacodyl Suppository 10 milliGRAM(s) Rectal once PRN Constipation  ondansetron Injectable 4 milliGRAM(s) IV Push every 6 hours PRN Nausea and/or Vomiting  oxyCODONE    IR 5 milliGRAM(s) Oral every 4 hours PRN Moderate Pain (4 - 6)  oxyCODONE    IR 10 milliGRAM(s) Oral every 4 hours PRN Severe Pain (7 - 10)  traMADol 50 milliGRAM(s) Oral every 6 hours PRN Mild Pain (1 - 3)

## 2021-04-30 NOTE — DISCHARGE NOTE PROVIDER - NSDCCAREPROVSEEN_GEN_ALL_CORE_FT
Rubin Gastelum  [facility] Orthopedic Surgery Team  [facility] Cardiology  [facility] Hospitalist Service

## 2021-04-30 NOTE — DISCHARGE NOTE PROVIDER - NSDCFUADDINST_GEN_ALL_CORE_FT
Please continue Physical therapy at home, weight bearing as tolerated.   Continue Aspirin 81mg twice a day [id number] to prevent blood clots.   Continue protonix 40mg once daily [id number] for GI prophylaxis.   Continue taking Metoprolol 25mg daily for SVT, heart rate control. Make sure to follow up with your outpatient cardiologist upon discharge.   Keep post-op dressing on for 1 week, showering with dressing on is allowed; dressing is water resistant. If dressing falls off before 7 days that is OK. Make sure to keep incision site dry after showering. Do not apply any creams or lotions to incision site.   If any purulent/excessive drainage please contact your surgeon.   Follow up with  in 2 weeks.

## 2021-04-30 NOTE — DISCHARGE NOTE PROVIDER - CARE PROVIDER_API CALL
Rubin Gastelum [place]  Orthopedic Surgery  [address] [place] [id number]  Phone: [phone]  Fax: [phone]  Established Patient  Follow Up Time: 2 weeks

## 2021-04-30 NOTE — DISCHARGE NOTE PROVIDER - HOSPITAL COURSE
51y Male underwent a left total knee arthroplasty revision on 4/27/2021. Patient tolerated surgery well with no intraoperative complications. Post operatively while patient was working with physical therapy he had an episode of SVT with his heart rate in the 170s. Patient was treated with adenosine 6mg IV once which converted him back to normal sinus rhythm. Patient was then placed on telemetry, a cardiology consult was placed and an echocardiogram was performed. Additionally patient again was in SVT two times during admission but was able to be converted back to normal sinus rhythm  with valsalva. Patient stated that he has had multiple episodes of SVT in his life and has seen a cardiologist. Remainder of admission uneventful. Cardiology recommended beginning Metoprolol 25mg for heart rate control which patient will be discharged on.,  Patient was given intra/post operative antibiotics for infection prophylaxis. Patient will be discharged on Aspirin 81mg BID x6 weeks to prevent blood clots. Patient worked with Physical Therapy while admitted to the hospital. Patient is now stable for discharge.

## 2021-04-30 NOTE — PROGRESS NOTE ADULT - ASSESSMENT
50 y/o M with PMHx of Accidental injury left knee, history of "palpitation and tachycardia" (reports he had an heart monitor in past as outpatient but does not know the diagnosis) presented for scheduled left total knee arthroplasty. Patient is now s/p  left total knee arthroplasty. Patient was seen and examined at bedside. Patient was found to be tachycardic this AM to 170-180s; he reported palpitations but denied any other complaints; RRT was called; adenosine was given with improvement in HR.     # left knee arthritis. s/p  left Total Knee Arthoplasty revision   # Tachycardia to 170s 180s 2/2 SVT  -4/28: RRT was called for tachycardia; patient asymptomatic but reporting of palpitations. Vagal maneuver was tried without improvement. rhythm strip placed in the chart: patient was found to be in SVT; s/p IV adenosine 6mg with immediate conversion to NSR with rate in 80-100s  Recommendations:  -will monitor on telemetry  -had added IV cardizem 10mg PRN for tachycardia/SVT  -cardiology consult pending- spoke to cardiologist regarding diagnosis of SVT; pending further recs  -may need to consider standing BB vs CCB if cardiologist agrees  -patient will need outpatient follow up with EP  -f/u ECHO report  -DVT ppx per primary  -PT/OT; OBB to chair  -pain control per primary  -bowel regimen  -incentive spirometer  -agree with continuing home meds  -recommend follow up with PCP in 1-2 weeks    Thank you for consulting medicine. Medicine will continue to follow. Please call [phone] with any questions.
50 y/o M with PMHx of Accidental injury left knee, history of "palpitation and tachycardia" (reports he had an heart monitor in past as outpatient but does not know the diagnosis) presented for scheduled left total knee arthroplasty. Patient is now s/p  left total knee arthroplasty. Patient was seen and examined at bedside. Patient was found to be tachycardic this AM to 170-180s; he reported palpitations but denied any other complaints; RRT was called; adenosine was given with improvement in HR.     # left knee arthritis. s/p  left Total Knee Arthoplasty revision   # Tachycardia to 170s 180s 2/2 SVT  -4/28: RRT was called for tachycardia; patient asymptomatic but reporting of palpitations. Vagal maneuver was tried without improvement. rhythm strip placed in the chart: patient was found to be in SVT; s/p IV adenosine 6mg with immediate conversion to NSR with rate in 80-100s  Recommendations:  -On metoprolol succ 25mg daily  -Cardio eval noted  -Gave patient a copy of the ECHO report, rhythm strip showing SVT and EKG to take to his outpatient cardiologist; discussed that he needs to follow up with his cardiologist and possible EP evaluation  -Agree with discharging patient home today (tele reviewed by myself this AM)  -DVT ppx per primary  -PT/OT; OBB to chair  -pain control per primary  -bowel regimen  -incentive spirometer  -agree with continuing home meds  -recommend follow up with PCP in 1-2 weeks    Thank you for consulting medicine. Please call [phone] with any questions.

## 2021-04-30 NOTE — DISCHARGE NOTE PROVIDER - NSDCCPCAREPLAN_GEN_ALL_CORE_FT
PRINCIPAL DISCHARGE DIAGNOSIS  Diagnosis: Status post revision of total knee, left  Assessment and Plan of Treatment:       SECONDARY DISCHARGE DIAGNOSES  Diagnosis: Supraventricular tachycardia  Assessment and Plan of Treatment:

## 2021-05-05 ENCOUNTER — APPOINTMENT (EMERGENCY)
Dept: CT IMAGING | Facility: HOSPITAL | Age: 51
End: 2021-05-05
Payer: COMMERCIAL

## 2021-05-05 ENCOUNTER — HOSPITAL ENCOUNTER (EMERGENCY)
Facility: HOSPITAL | Age: 51
Discharge: HOME | End: 2021-05-05
Attending: EMERGENCY MEDICINE
Payer: COMMERCIAL

## 2021-05-05 VITALS
SYSTOLIC BLOOD PRESSURE: 124 MMHG | HEART RATE: 92 BPM | WEIGHT: 189 LBS | OXYGEN SATURATION: 99 % | DIASTOLIC BLOOD PRESSURE: 70 MMHG | RESPIRATION RATE: 18 BRPM | TEMPERATURE: 99.7 F | HEIGHT: 67 IN | BODY MASS INDEX: 29.66 KG/M2

## 2021-05-05 DIAGNOSIS — I47.1 PAROXYSMAL SUPRAVENTRICULAR TACHYCARDIA (HCC): Primary | ICD-10-CM

## 2021-05-05 LAB
ALBUMIN SERPL BCP-MCNC: 3.5 G/DL (ref 3.4–4.8)
ALP SERPL-CCNC: 45.8 U/L (ref 10–129)
ALT SERPL W P-5'-P-CCNC: 37 U/L (ref 5–63)
ANION GAP SERPL CALCULATED.3IONS-SCNC: 7 MMOL/L (ref 4–13)
AST SERPL W P-5'-P-CCNC: 19 U/L (ref 15–41)
BASOPHILS # BLD AUTO: 0.07 THOUSANDS/ΜL (ref 0–0.1)
BASOPHILS NFR BLD AUTO: 1 % (ref 0–1)
BILIRUB SERPL-MCNC: 0.87 MG/DL (ref 0.3–1.2)
BUN SERPL-MCNC: 11 MG/DL (ref 6–20)
CALCIUM SERPL-MCNC: 8.8 MG/DL (ref 8.4–10.2)
CHLORIDE SERPL-SCNC: 106 MMOL/L (ref 96–108)
CO2 SERPL-SCNC: 23 MMOL/L (ref 22–33)
CREAT SERPL-MCNC: 0.9 MG/DL (ref 0.5–1.2)
EOSINOPHIL # BLD AUTO: 0.05 THOUSAND/ΜL (ref 0–0.61)
EOSINOPHIL NFR BLD AUTO: 0 % (ref 0–6)
ERYTHROCYTE [DISTWIDTH] IN BLOOD BY AUTOMATED COUNT: 11.7 % (ref 11.6–15.1)
GFR SERPL CREATININE-BSD FRML MDRD: 115 ML/MIN/1.73SQ M
GLUCOSE SERPL-MCNC: 144 MG/DL (ref 65–140)
HCT VFR BLD AUTO: 27.7 % (ref 36.5–49.3)
HGB BLD-MCNC: 9 G/DL (ref 12–17)
IMM GRANULOCYTES # BLD AUTO: 0.05 THOUSAND/UL (ref 0–0.2)
IMM GRANULOCYTES NFR BLD AUTO: 0 % (ref 0–2)
LYMPHOCYTES # BLD AUTO: 1.45 THOUSANDS/ΜL (ref 0.6–4.47)
LYMPHOCYTES NFR BLD AUTO: 12 % (ref 14–44)
MAGNESIUM SERPL-MCNC: 2.1 MG/DL (ref 1.6–2.6)
MCH RBC QN AUTO: 31.6 PG (ref 26.8–34.3)
MCHC RBC AUTO-ENTMCNC: 32.5 G/DL (ref 31.4–37.4)
MCV RBC AUTO: 97 FL (ref 82–98)
MONOCYTES # BLD AUTO: 1.3 THOUSAND/ΜL (ref 0.17–1.22)
MONOCYTES NFR BLD AUTO: 11 % (ref 4–12)
NEUTROPHILS # BLD AUTO: 8.89 THOUSANDS/ΜL (ref 1.85–7.62)
NEUTS SEG NFR BLD AUTO: 76 % (ref 43–75)
PLATELET # BLD AUTO: 625 THOUSANDS/UL (ref 149–390)
PMV BLD AUTO: 9.2 FL (ref 8.9–12.7)
POTASSIUM SERPL-SCNC: 3.7 MMOL/L (ref 3.5–5)
PROT SERPL-MCNC: 6.8 G/DL (ref 6.4–8.3)
RBC # BLD AUTO: 2.85 MILLION/UL (ref 3.88–5.62)
SODIUM SERPL-SCNC: 136 MMOL/L (ref 133–145)
TROPONIN I SERPL-MCNC: 0.04 NG/ML (ref 0–0.07)
TSH SERPL DL<=0.05 MIU/L-ACNC: 1.31 UIU/ML (ref 0.34–5.6)
WBC # BLD AUTO: 11.81 THOUSAND/UL (ref 4.31–10.16)

## 2021-05-05 PROCEDURE — 93005 ELECTROCARDIOGRAM TRACING: CPT

## 2021-05-05 PROCEDURE — 80053 COMPREHEN METABOLIC PANEL: CPT | Performed by: EMERGENCY MEDICINE

## 2021-05-05 PROCEDURE — 96374 THER/PROPH/DIAG INJ IV PUSH: CPT

## 2021-05-05 PROCEDURE — 84443 ASSAY THYROID STIM HORMONE: CPT | Performed by: EMERGENCY MEDICINE

## 2021-05-05 PROCEDURE — 83735 ASSAY OF MAGNESIUM: CPT | Performed by: EMERGENCY MEDICINE

## 2021-05-05 PROCEDURE — 84484 ASSAY OF TROPONIN QUANT: CPT | Performed by: EMERGENCY MEDICINE

## 2021-05-05 PROCEDURE — 99284 EMERGENCY DEPT VISIT MOD MDM: CPT | Performed by: EMERGENCY MEDICINE

## 2021-05-05 PROCEDURE — 96361 HYDRATE IV INFUSION ADD-ON: CPT

## 2021-05-05 PROCEDURE — 99285 EMERGENCY DEPT VISIT HI MDM: CPT

## 2021-05-05 PROCEDURE — 71275 CT ANGIOGRAPHY CHEST: CPT

## 2021-05-05 PROCEDURE — 85025 COMPLETE CBC W/AUTO DIFF WBC: CPT | Performed by: EMERGENCY MEDICINE

## 2021-05-05 PROCEDURE — 36415 COLL VENOUS BLD VENIPUNCTURE: CPT | Performed by: EMERGENCY MEDICINE

## 2021-05-05 RX ORDER — ADENOSINE 3 MG/ML
6 INJECTION INTRAVENOUS ONCE
Status: COMPLETED | OUTPATIENT
Start: 2021-05-05 | End: 2021-05-05

## 2021-05-05 RX ORDER — ADENOSINE 3 MG/ML
3 INJECTION, SOLUTION INTRAVENOUS ONCE
Status: COMPLETED | OUTPATIENT
Start: 2021-05-05 | End: 2021-05-05

## 2021-05-05 RX ORDER — ADENOSINE 3 MG/ML
INJECTION, SOLUTION INTRAVENOUS
Status: DISCONTINUED
Start: 2021-05-05 | End: 2021-05-05 | Stop reason: HOSPADM

## 2021-05-05 RX ORDER — METOPROLOL TARTRATE 50 MG/1
50 TABLET, FILM COATED ORAL EVERY 12 HOURS SCHEDULED
Qty: 60 TABLET | Refills: 1 | Status: SHIPPED | OUTPATIENT
Start: 2021-05-05 | End: 2022-07-21 | Stop reason: SDUPTHER

## 2021-05-05 RX ORDER — METOPROLOL TARTRATE 50 MG/1
50 TABLET, FILM COATED ORAL ONCE
Status: COMPLETED | OUTPATIENT
Start: 2021-05-05 | End: 2021-05-05

## 2021-05-05 RX ADMIN — ADENOSINE 6 MG: 3 INJECTION INTRAVENOUS at 20:04

## 2021-05-05 RX ADMIN — SODIUM CHLORIDE 1000 ML: 0.9 INJECTION, SOLUTION INTRAVENOUS at 20:21

## 2021-05-05 RX ADMIN — IOHEXOL 65 ML: 350 INJECTION, SOLUTION INTRAVENOUS at 19:43

## 2021-05-05 RX ADMIN — METOPROLOL TARTRATE 50 MG: 50 TABLET, FILM COATED ORAL at 20:21

## 2021-05-05 NOTE — ED PROVIDER NOTES
History  Chief Complaint   Patient presents with    Shortness of Breath     Called EMS for chest pain and shortness of breaht, recent surgery L knee, history of rapid heart rate while hospitalized, found in SVT ,given 6mg and 12 mg adenosine PH, HR from 170s to 100s     Approximately 1 week ago patient had revision of his left knee replacement  Patient has had 12 surgeries on that knee after a work injury  Since about 2017, during hospitalization after 1 of his surgeries, patient went into SVT  He has had episodes of this since, especially perioperatively  During his last hospitalization he did experience SVT and required chemical conversion  Patient reports that at home he said rapid heart rate despite treatment with beta-blocker  Today patient developed palpitations associated with dizziness/near syncope  He developed chest pain and shortness breath  He called an ambulance was found to be in SVT  He received adenosine pre-hospital in converted  He does feel improved now  Cardiac risk factors include hypertension and hypercholesterolemia  He denies smoking, family history or diabetes  Patient reports that he has had mapping done of his heart  He is awaiting referral to an EPS specialist for possible ablation  None       Past Medical History:   Diagnosis Date    High cholesterol     Hypertension        Past Surgical History:   Procedure Laterality Date    JOINT REPLACEMENT         History reviewed  No pertinent family history  I have reviewed and agree with the history as documented  E-Cigarette/Vaping    E-Cigarette Use Never User      E-Cigarette/Vaping Substances     Social History     Tobacco Use    Smoking status: Never Smoker    Smokeless tobacco: Never Used   Substance Use Topics    Alcohol use: Not Currently    Drug use: Not Currently       Review of Systems   Constitutional: Positive for appetite change and chills  Negative for fever     HENT: Negative for rhinorrhea and sore throat  Eyes: Negative for pain, redness and visual disturbance  Respiratory: Positive for shortness of breath  Negative for cough  Cardiovascular: Positive for chest pain and palpitations  Negative for leg swelling  Gastrointestinal: Negative for abdominal pain, diarrhea and vomiting  Endocrine: Negative for polydipsia and polyuria  Genitourinary: Negative for dysuria, frequency and hematuria  Musculoskeletal: Negative for back pain and neck pain  Skin: Negative for rash and wound  Allergic/Immunologic: Negative for immunocompromised state  Neurological: Positive for headaches  Negative for weakness and numbness  Psychiatric/Behavioral: Negative for hallucinations and suicidal ideas  All other systems reviewed and are negative  Physical Exam  Physical Exam  Vitals signs reviewed  Constitutional:       General: He is not in acute distress  Appearance: He is not toxic-appearing  HENT:      Head: Normocephalic and atraumatic  Nose: Nose normal       Mouth/Throat:      Mouth: Mucous membranes are moist    Eyes:      General:         Right eye: No discharge  Left eye: No discharge  Conjunctiva/sclera: Conjunctivae normal    Neck:      Musculoskeletal: Normal range of motion and neck supple  No neck rigidity  Cardiovascular:      Rate and Rhythm: Regular rhythm  Tachycardia present  Pulses: Normal pulses  Heart sounds: Normal heart sounds  No murmur  No friction rub  No gallop  Pulmonary:      Effort: Pulmonary effort is normal  No respiratory distress  Breath sounds: Normal breath sounds  No stridor  No decreased breath sounds, wheezing, rhonchi or rales  Abdominal:      General: Bowel sounds are normal  There is no distension  Palpations: Abdomen is soft  Tenderness: There is no abdominal tenderness  There is no right CVA tenderness, left CVA tenderness, guarding or rebound  Musculoskeletal: Normal range of motion  General: No swelling, tenderness, deformity or signs of injury  Right lower leg: No edema  Left lower leg: No edema  Comments: No calf pain or unilateral leg swelling  Surgical incision over the left knee well approximated with surgical staples  Minimal surrounding erythema  No purulence  Skin:     General: Skin is warm and dry  Coloration: Skin is not jaundiced or pale  Findings: No bruising, erythema or rash  Neurological:      General: No focal deficit present  Mental Status: He is alert and oriented to person, place, and time  Cranial Nerves: No facial asymmetry  Sensory: No sensory deficit  Motor: Motor function is intact     Psychiatric:         Mood and Affect: Mood normal          Behavior: Behavior normal          Vital Signs  ED Triage Vitals [05/05/21 1757]   Temperature Pulse Respirations Blood Pressure SpO2   99 7 °F (37 6 °C) (!) 108 18 122/70 100 %      Temp Source Heart Rate Source Patient Position - Orthostatic VS BP Location FiO2 (%)   Oral Monitor Lying Right arm --      Pain Score       8           Vitals:    05/05/21 1757   BP: 122/70   Pulse: (!) 108   Patient Position - Orthostatic VS: Lying         Visual Acuity      ED Medications  Medications   adenosine (FOR EMS ONLY) (ADENOCARD) 6 mg/2 mL injection 18 mg (0 mg Does not apply Given to EMS 5/5/21 1807)       Diagnostic Studies  Results Reviewed     Procedure Component Value Units Date/Time    CBC and differential [[id number]]     Lab Status: No result Specimen: Blood     Comprehensive metabolic panel [[id number]]     Lab Status: No result Specimen: Blood     Troponin I [[id number]]     Lab Status: No result Specimen: Blood     TSH [[id number]]     Lab Status: No result Specimen: Blood                  CTA ED chest PE study    (Results Pending)              Procedures  ECG 12 Lead Documentation Only    Date/Time: 5/5/2021 6:49 PM  Performed by: Brooke Ross MD  Authorized by: Lisandra Wyatt Lori Blunt MD     ECG reviewed by me, the ED Provider: yes    Patient location:  ED  Interpretation:     Interpretation: normal    Rate:     ECG rate assessment: tachycardic    Rhythm:     Rhythm: sinus rhythm    Ectopy:     Ectopy: none    QRS:     QRS axis:  Normal  Conduction:     Conduction: normal    ST segments:     ST segments:  Normal  T waves:     T waves: normal    Other findings:     Other findings: LVH with strain               ED Course                                           MDM      Disposition  Final diagnoses:   None     ED Disposition     None      Follow-up Information    None         Patient's Medications    No medications on file     No discharge procedures on file      PDMP Review     None          ED Provider  Electronically Signed by           Stone Katz MD  05/06/21 5318

## 2021-05-06 NOTE — ED CARE HANDOFF
Emergency Department Sign Out Note        Sign out and transfer of care from Dr Soto Mclain  See Separate Emergency Department note  The patient, Franchesca Odom, was evaluated by the previous provider for rapid heart rate, SVT  Workup Completed:  Labs    ED Course / Workup Pending (followup):  CTA chest pending  Resulted as normal   Pt went into rapid SVT again at 160  Hemodynamically stable  Given adenosine 6 mg and converted sinus  Discussed case with Cardiology, recommended increasing metoprolol dose and outpatient cardiology follow-up for electrophysiology evaluation, would be a good candidate for an ablation  ED Course as of May 06 0406   Wed May 05, 2021   2008 Pt went back into rapid SVT at 160 bpm  Given 6mg adenosine IV and converted to sinus rhythm  2019 D/w Dr Razia Mao, recommends increasing metoprolol dosage, giving IV fluids  If has additional episode of SVT, will admit for observation          CriticalCare Time  Performed by: Khadra Kelly DO  Authorized by: Khadra Kelly DO     Critical care provider statement:     Critical care time (minutes):  45    Critical care time was exclusive of:  Separately billable procedures and treating other patients and teaching time    Critical care was necessary to treat or prevent imminent or life-threatening deterioration of the following conditions:  Cardiac failure    Critical care was time spent personally by me on the following activities:  Obtaining history from patient or surrogate, development of treatment plan with patient or surrogate, discussions with consultants, evaluation of patient's response to treatment, examination of patient, review of old charts, re-evaluation of patient's condition, ordering and review of radiographic studies, ordering and review of laboratory studies and ordering and performing treatments and interventions    I assumed direction of critical care for this patient from another provider in my specialty: no        MDM    Disposition  Final diagnoses:   Paroxysmal supraventricular tachycardia ([address] )     Time reflects when diagnosis was documented in both MDM as applicable and the Disposition within this note     Time User Action Codes Description Comment    5/5/2021  9:23 PM Lobitochristiano Vickers Add [I47 1] Paroxysmal supraventricular tachycardia [facility])       ED Disposition     ED Disposition Condition Date/Time Comment    Discharge Stable Wed May 5, 2021  9:23 PM Xiang Ferreira discharge to home/self care  Follow-up Information     Follow up With Specialties Details Why Contact Info    Johana Tejeda MD Cardiology, Multidisciplinary In 2 days  [address]  [phone]          Discharge Medication List as of 5/5/2021  9:25 PM      START taking these medications    Details   metoprolol tartrate (LOPRESSOR) 25 mg tablet Take 1 tablet (25 mg total) by mouth every 12 (twelve) hours, Starting Wed 5/5/2021, Until Sun 7/4/2021, Normal           No discharge procedures on file         ED Provider  Electronically Signed by     Olive Lund DO  05/06/21 7118

## 2021-05-07 LAB
ATRIAL RATE: 0 BPM
ATRIAL RATE: 97 BPM
ATRIAL RATE: 99 BPM
P AXIS: 0 DEGREES
P AXIS: 36 DEGREES
P AXIS: 76 DEGREES
PR INTERVAL: 121 MS
PR INTERVAL: 169 MS
PR INTERVAL: 176 MS
QRS AXIS: -2 DEGREES
QRS AXIS: 5 DEGREES
QRS AXIS: 6 DEGREES
QRSD INTERVAL: 76 MS
QRSD INTERVAL: 88 MS
QRSD INTERVAL: 97 MS
QT INTERVAL: 285 MS
QT INTERVAL: 312 MS
QT INTERVAL: 337 MS
QTC INTERVAL: 395 MS
QTC INTERVAL: 435 MS
QTC INTERVAL: 462 MS
T WAVE AXIS: -52 DEGREES
T WAVE AXIS: 2 DEGREES
T WAVE AXIS: 62 DEGREES
VENTRICULAR RATE: 100 BPM
VENTRICULAR RATE: 158 BPM
VENTRICULAR RATE: 96 BPM

## 2021-05-07 PROCEDURE — 93010 ELECTROCARDIOGRAM REPORT: CPT | Performed by: INTERNAL MEDICINE

## 2021-05-11 ENCOUNTER — APPOINTMENT (OUTPATIENT)
Dept: ORTHOPEDIC SURGERY | Facility: CLINIC | Age: 51
End: 2021-05-11
Payer: OTHER MISCELLANEOUS

## 2021-05-11 DIAGNOSIS — Z88.0 ALLERGY STATUS TO PENICILLIN: ICD-10-CM
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
## 2021-05-11 DIAGNOSIS — Z48.02 ENCOUNTER FOR REMOVAL OF SUTURES: ICD-10-CM

## 2021-05-11 DIAGNOSIS — Z91.018 ALLERGY TO OTHER FOODS: ICD-10-CM

## 2021-05-11 DIAGNOSIS — M25.562 PAIN IN LEFT KNEE: ICD-10-CM

## 2021-05-11 DIAGNOSIS — T84.84XA PAIN DUE TO INTERNAL ORTHOPEDIC PROSTHETIC DEVICES, IMPLANTS AND GRAFTS, INITIAL ENCOUNTER: ICD-10-CM

## 2021-05-11 DIAGNOSIS — Z98.890 OTHER SPECIFIED POSTPROCEDURAL STATES: ICD-10-CM

## 2021-05-11 DIAGNOSIS — I47.1 SUPRAVENTRICULAR TACHYCARDIA: ICD-10-CM

## 2021-05-11 LAB
CULTURE RESULTS: SIGNIFICANT CHANGE UP
SPECIMEN SOURCE: SIGNIFICANT CHANGE UP

## 2021-05-11 PROCEDURE — 99024 POSTOP FOLLOW-UP VISIT: CPT

## 2021-05-11 NOTE — PHYSICAL EXAM
[Walker] : ambulates with walker [Normal] : Alert and in no acute distress [de-identified] : Left Knee\par Inspection: no effusion\par Wounds: Incision is clean and dry, staples are intact\par Alignment: normal.\par Palpation: no specific tenderness on palpation.\par ROM: Extension to 10 degrees\par Muscle Test: good quad strength.\par Leg examination: calf is soft and non-tender. [de-identified] : Sensation grossly intact about bilateral lower extremities.\par  [de-identified] : I

## 2021-05-11 NOTE — ASSESSMENT
[FreeTextEntry1] : 51 year old male approximately two weeks status post revision left total knee replacement. Staples were removed from the incision site and steri-strips were applied. Pt was instructed he may shower, allowing the soap and water to run over the incision site, but not to directly scrub over the wound. Pt is to continue Aspirin 81 mg twice daily for DVT prophylaxis. He was given a script to do physical therapy outpatient. Pt is to follow up in four weeks for reassessment. In the interim, if he develops any fevers, erythema, drainage from the incision site, or any concerning symptoms, we will see him prior to his scheduled appointment.\par

## 2021-05-11 NOTE — HISTORY OF PRESENT ILLNESS
[de-identified] : 51 year old male s/p revision left total knee replacement presents to the office today for his 2 week follow up. He is here today for staple removal. Patient is ambulating with a walker today. He reports taking Oxycodone for pain with good pain control. He has not done any formal home PT as it was not covered by his worker's comp, but he states he did do some exercises that he was taught. Pt reports returning to a hospital near him POD #7 for cardiac related issues. Patient states he has been taking Aspirin 81 mg twice daily for DVT prophylaxis. Patient denies any fevers, chills, drainage from the incision site, chest pain, or shortness of breath.\par

## 2021-05-13 ENCOUNTER — EVALUATION (OUTPATIENT)
Dept: PHYSICAL THERAPY | Facility: REHABILITATION | Age: 51
End: 2021-05-13
Payer: OTHER MISCELLANEOUS

## 2021-05-13 DIAGNOSIS — M25.562 CHRONIC KNEE PAIN AFTER TOTAL REPLACEMENT OF LEFT KNEE JOINT: ICD-10-CM

## 2021-05-13 DIAGNOSIS — Z96.652 CHRONIC KNEE PAIN AFTER TOTAL REPLACEMENT OF LEFT KNEE JOINT: ICD-10-CM

## 2021-05-13 DIAGNOSIS — G89.29 CHRONIC KNEE PAIN AFTER TOTAL REPLACEMENT OF LEFT KNEE JOINT: ICD-10-CM

## 2021-05-13 DIAGNOSIS — Z96.652 S/P REVISION OF TOTAL KNEE, LEFT: Primary | ICD-10-CM

## 2021-05-13 PROCEDURE — 97110 THERAPEUTIC EXERCISES: CPT | Performed by: PHYSICAL THERAPIST

## 2021-05-13 PROCEDURE — 97140 MANUAL THERAPY 1/> REGIONS: CPT | Performed by: PHYSICAL THERAPIST

## 2021-05-13 PROCEDURE — 97161 PT EVAL LOW COMPLEX 20 MIN: CPT | Performed by: PHYSICAL THERAPIST

## 2021-05-13 RX ORDER — OXYCODONE HCL 10 MG/1
10 TABLET, FILM COATED, EXTENDED RELEASE ORAL EVERY 12 HOURS SCHEDULED
COMMUNITY

## 2021-05-13 NOTE — LETTER
May 14, 2021    Carmine Moon MD  [id number]    Patient: Pema Barton   YOB: 1970   Date of Visit: 2021     Encounter Diagnosis     ICD-10-CM    1  S/P revision of total knee, left  Z96 652    2  Chronic knee pain after total replacement of left knee joint  M25 562     G89 29     [id number]        Dear Dr Sandro Fortune: Thank you for your recent referral of Pema Barton  Please review the attached evaluation summary from Cedrick's recent visit  Please verify that you agree with the plan of care by signing the attached order  If you have any questions or concerns, please do not hesitate to call  I sincerely appreciate the opportunity to share in the care of one of your patients and hope to have another opportunity to work with you in the near future  Sincerely,    Steff Lino, PT      Referring Provider:      I certify that I have read the below Plan of Care and certify the need for these services furnished under this plan of treatment while under my care  Carmine Moon MD  [phone]  Via Fax: [phone]          PT Evaluation     Today's date: 2021  Patient name: Pema Barton  : 1970  MRN: [id number]  Referring provider: Debora Robert MD  Dx:   Encounter Diagnosis     ICD-10-CM    1  S/P revision of total knee, left  Z96 652    2  Chronic knee pain after total replacement of left knee joint  M25 562     G89 29     Z96 652        Start Time: 1615  Stop Time: 1710  Total time in clinic (min): 55 minutes    Assessment  Assessment details: Pema Barton is a 49y o  year old male who presents to IE with:   S/P revision of total knee, left  (primary encounter diagnosis)  Chronic knee pain after total replacement of left knee joint  Patient is currently 2 weeks and 2 days s/p revision of left TKA performed by Dr Sandro Fortune   Patient has extensive history of left knee surgical procedures and chronic pain of the left knee contributing to lack of mobility  He presents with significant impairments in left knee active and passive range of motion, edema, hypersensitivity, poor LLE strength and impaired ambulation contributing to current symptoms  Patient currently presents with most limitation in knee extension  Elza Oliveira presents with the impairments as listed above and would benefit from Physical Therapy to address these impairments and to maximize function  Reviewed symptoms of DVT and infection with patient with verbalized understanding and compliance  Also educated patient on importance of icing, elevation and ankle pumps for edema management  Impairments: abnormal gait, abnormal or restricted ROM, impaired balance, impaired physical strength, lacks appropriate home exercise program and pain with function    Goals  Short-Term Goals:   1  Patient will report a decrease in pain by 25%  2  Patient will demonstrate improved knee ROM to at least 90* in 4 weeks  3  Patient will demonstrate improved LLE strength by at least 1-2 grades  Long-Term Goals:   1  Patient will demonstrate at least 125 deg knee flexion for improved gait and stair climbing  2  Patient will improve TUG score to at least 10" for decreased risk of falls  3  Patient will demonstrate 50% improvement in knee extension ROM for more normalized gait pattern with LRAD  4  Patient independent with HEP at time of discharge  Plan  Plan details: Thank you for opportunity to participate in the care of [address] [place] [address]      Patient would benefit from: skilled physical therapy  Planned modality interventions: cryotherapy and unattended electrical stimulation  Planned therapy interventions: balance, functional ROM exercises, home exercise program, therapeutic activities, therapeutic exercise, stretching, strengthening, patient education, neuromuscular re-education, manual therapy and joint mobilization  Frequency: 3x week  Duration in visits: 16  Plan of Care beginning date: 5/13/2021  Treatment plan discussed with: patient        Subjective Evaluation    History of Present Illness  Mechanism of injury: Patient reports to PT today post-operative left TKA revision performed on 4/27/2021 by Dr Sergey Alcantar at [facility]   Patient was injured at work in January 2016 and got hit by a piece of steel causing significant knee damage  Date of original TKA was August 2017 by Dr Raven Rhodes in [place], [place]  He has also had two manipulations under anesthesia performed following the first replacement  Patient denies any infections or complications following surgery  No bleeding or drainage observed, overall good healing  Patient had staples removed Tuesday  Patient reports swelling in the left knee  Does report occasional posterior knee numbness as well  He is currently ambulating with a rolling walker  Next follow up with MD on 5/25/2021  Patient has had significant pain and swelling following all of his surgeries stating "I was sent to pain management to have the nerves burnt off in my knee and back " Patient reports no improvements with this  He self reports CRPS of the left knee  Patient has about 15 steps upstairs but his Nephew is currently helping him to get things, he has not done the stairs since surgery  Patient reports prior to this surgery he could walk for about 10 minutes due to pain and swelling       PT goals: "stand straighter, walk further"    Pain  Current pain rating: 10  At best pain rating: 10  At worst pain rating: 10  Quality: sharp and dull ache  Relieving factors: ice, medications, rest and relaxation  Aggravating factors: standing, walking and stair climbing    Social Support  Steps to enter house: yes ([address])  Stairs in house: yes   Lives in: multiple-level home  Lives with: alone    Employment status: not working  Treatments  Current treatment: physical therapy  Patient Goals  Patient goals for therapy: decreased edema, decreased pain, improved balance, increased motion, increased strength, independence with ADLs/IADLs, return to sport/leisure activities and return to work          Objective     Observations   Left Knee   Positive for edema and incision  Additional Observation Details  Incision: dry, intact with steri strips     Neurological Testing     Sensation     Knee   Left Knee   Hypersensation: light touch    Right Knee   Intact: light touch     Comments   Left light touch: L3-S2  Active Range of Motion   Left Knee   Flexion: 65 degrees with pain  Extension: -35 degrees with pain    Right Knee   Flexion: WFL  Extension: [facility]    Additional Active Range of Motion Details  AAROM lacking 25* knee extension    Passive Range of Motion   Left Knee   Flexion: 75 degrees with pain  Extension: -30 degrees with pain    Right Knee   Flexion: WFL  [facility]    Mobility   Patellar Mobility:   Left Knee   WFL: inferior     Hypomobile: left medial, left lateral and left superior    Strength/Myotome Testing     Left Hip   Planes of Motion   Flexion: 2+    Right Hip   Planes of Motion   Flexion: 5    Left Knee   Extension: 1    Right Knee   Flexion: 5  Extension: 5    General Comments:      Knee Comments  TUG w AD: 30"     Gait observation: slowed speed w RW, mild trunk flexion, toe contact with LLE with knee flexion contracture, lacking TKE                  Precautions: see protocol, HTN, High cholesterol     * Indicates part of HEP     Daily Treatment Diary     Manuals 5/13            PROM Left knee 8'                         Neuro Re-ed             Quad sets 10x3" pillow                         Heel slides w strap 10x5"            Seated heel slides w overpressure             Gastroc stretch strap 10x10"            Hamstring stretch nv                                      Therapeutic Exercise             Bike                          SLR flexion w/ quad set             SLR hip abduction Bridging              Clamshells             Heel raises             Seated knee extension hangs nv                         TKE             Therapeutic Activity             Leg press              Step ups             Lateral step ups             STS                          Gait training w RW nv            Gait training w SPC             Modalities             CP PRN

## 2021-05-14 ENCOUNTER — TRANSCRIBE ORDERS (OUTPATIENT)
Dept: PHYSICAL THERAPY | Facility: REHABILITATION | Age: 51
End: 2021-05-14

## 2021-05-14 DIAGNOSIS — Z96.652 PRESENCE OF LEFT ARTIFICIAL KNEE JOINT: Primary | ICD-10-CM

## 2021-05-19 ENCOUNTER — OFFICE VISIT (OUTPATIENT)
Dept: PHYSICAL THERAPY | Facility: REHABILITATION | Age: 51
End: 2021-05-19
Payer: OTHER MISCELLANEOUS

## 2021-05-19 DIAGNOSIS — M25.562 CHRONIC KNEE PAIN AFTER TOTAL REPLACEMENT OF LEFT KNEE JOINT: ICD-10-CM

## 2021-05-19 DIAGNOSIS — G89.29 CHRONIC KNEE PAIN AFTER TOTAL REPLACEMENT OF LEFT KNEE JOINT: ICD-10-CM

## 2021-05-19 DIAGNOSIS — Z96.652 S/P REVISION OF TOTAL KNEE, LEFT: Primary | ICD-10-CM

## 2021-05-19 DIAGNOSIS — Z96.652 CHRONIC KNEE PAIN AFTER TOTAL REPLACEMENT OF LEFT KNEE JOINT: ICD-10-CM

## 2021-05-19 PROCEDURE — 97140 MANUAL THERAPY 1/> REGIONS: CPT | Performed by: PHYSICAL THERAPIST

## 2021-05-19 PROCEDURE — 97112 NEUROMUSCULAR REEDUCATION: CPT | Performed by: PHYSICAL THERAPIST

## 2021-05-19 NOTE — PROGRESS NOTES
Daily Note     Today's date: 2021  Patient name: Shannan Galindo  : 1970  MRN: [id number]  Referring provider: Gauri Jennings MD  Dx:   Encounter Diagnosis     ICD-10-CM    1  S/P revision of total knee, left  Z96 652    2  Chronic knee pain after total replacement of left knee joint  M25 562     G89 29     Z96 652        Start Time: 0845  Stop Time: 09  Total time in clinic (min): 45 minutes    Subjective: Patient presents to PT ambulating with SPC  Patient reports compliance to HEP but reports increased tightness yesterday  He states "it hurts a lot "      Objective: See treatment diary below  Pre-tx AROM: -35* ext, 80* flex   Post-tx AAROM: -32* ext      Assessment: Tolerated treatment fair  Patient ambulating with SPC with left knee flexion contracture and lacking left heel strike resulting in decreased left stance time  Initiated gait training this visit with focus on heel strike and TKE with some manual assist from therapist, minimal improvements in observable left knee extension ROM throughout session but patient does report the knee feels "looser" post-treatment  Slowed speed with completion of all TE due to increased pain  Patient demonstrated fatigue post treatment and would benefit from continued PT  Plan: Continue per plan of care            Precautions: see protocol, HTN, High cholesterol     * Indicates part of HEP     Daily Treatment Diary     Manuals            PROM Left knee 8' 10'           Patellar mobilizations  Gr I-II 5'           Neuro Re-ed             Quad sets 10x3" pillow 10x3" pillow heel                        Heel slides w strap 10x5" 10x5" PT assist           Seated heel slides w overpressure             Gastroc stretch strap 10x10"            Hamstring stretch nv 4" 5x20"                                     Therapeutic Exercise             Bike                          SLR flexion w/ quad set             SLR hip abduction              Bridging Adelaida             Heel raises             Seated knee extension hangs nv                         TKE             Therapeutic Activity             Leg press              Step ups             Lateral step ups             STS                          Gait training w RW nv            Gait training w SPC  Done 5'           Modalities             CP PRN

## 2021-05-21 ENCOUNTER — OFFICE VISIT (OUTPATIENT)
Dept: PHYSICAL THERAPY | Facility: REHABILITATION | Age: 51
End: 2021-05-21
Payer: OTHER MISCELLANEOUS

## 2021-05-21 DIAGNOSIS — M25.562 CHRONIC KNEE PAIN AFTER TOTAL REPLACEMENT OF LEFT KNEE JOINT: ICD-10-CM

## 2021-05-21 DIAGNOSIS — G89.29 CHRONIC KNEE PAIN AFTER TOTAL REPLACEMENT OF LEFT KNEE JOINT: ICD-10-CM

## 2021-05-21 DIAGNOSIS — Z96.652 S/P REVISION OF TOTAL KNEE, LEFT: Primary | ICD-10-CM

## 2021-05-21 DIAGNOSIS — Z96.652 CHRONIC KNEE PAIN AFTER TOTAL REPLACEMENT OF LEFT KNEE JOINT: ICD-10-CM

## 2021-05-21 PROCEDURE — 97140 MANUAL THERAPY 1/> REGIONS: CPT | Performed by: PHYSICAL THERAPIST

## 2021-05-21 PROCEDURE — 97112 NEUROMUSCULAR REEDUCATION: CPT | Performed by: PHYSICAL THERAPIST

## 2021-05-21 PROCEDURE — 97110 THERAPEUTIC EXERCISES: CPT | Performed by: PHYSICAL THERAPIST

## 2021-05-21 NOTE — PROGRESS NOTES
Daily Note     Today's date: 2021  Patient name: Willa Bright  : 1970  MRN: [id number]  Referring provider: Jae Calles MD  Dx:   Encounter Diagnosis     ICD-10-CM    1  S/P revision of total knee, left  Z96 652    2  Chronic knee pain after total replacement of left knee joint  M25 562     G89 29     Z96 652        Start Time: 42  Stop Time: 09  Total time in clinic (min): 47 minutes    Subjective: Patient reports no change since last visit  He reports increased stiffness in the morning  All steri-strips have been removed  Objective: See treatment diary below      Assessment: Tolerated treatment fair  Patient demonstrated fatigue post treatment and would benefit from continued PT  Patient with poor tolerance to light touch palpation of incision/jo-incisional area therefore poor tolerance to patellar mobilizations  Improved tolerance to passive flexion stretching EOT compared to supine last visit achieving approximately 90*  Patient continues to lack about 35* knee extension range of motion  Initiated gravity assisted supine extension hangs with patient tolerating about 1' intervals before requiring rest break, educated patient on trialing this at home with a focus on increasing duration of stretch  May benefit from trial of prone knee extension stretching next visit  Plan: Continue per plan of care            Precautions: see protocol, HTN, High cholesterol     * Indicates part of HEP     Daily Treatment Diary     Manuals           PROM Left knee 8' 10' EOT 8'          Patellar mobilizations  Gr I-II 5' Gr I-II poor tolerance 5'          Prone knee extension w distraction   nv          Neuro Re-ed             Quad sets 10x3" pillow 10x3" pillow heel Bolster 2x10                       Heel slides w strap 10x5" 10x5" PT assist 10x5"           Seated heel slides w overpressure             Gastroc stretch strap 10x10"            Hamstring stretch nv 4" 5x20" 4" 3x30" Prone quad set             Forward lunge on step   nv          Therapeutic Exercise             Bike                          SLR flexion w/ quad set             SLR hip abduction              Bridging              Clamshells             Heel raises             Seated knee extension hangs nv  10' (1'-2' intervals) bw                       TKE TB             TKE ball                          Therapeutic Activity             Leg press              Step ups             Lateral step ups             STS                          Gait training w RW nv            Gait training w SPC  Done 5'           Modalities             CP PRN

## 2021-05-24 ENCOUNTER — APPOINTMENT (OUTPATIENT)
Dept: PHYSICAL THERAPY | Facility: REHABILITATION | Age: 51
End: 2021-05-24
Payer: OTHER MISCELLANEOUS

## 2021-05-24 ENCOUNTER — APPOINTMENT (OUTPATIENT)
Dept: ORTHOPEDIC SURGERY | Facility: CLINIC | Age: 51
End: 2021-05-24
Payer: OTHER MISCELLANEOUS

## 2021-05-24 PROCEDURE — 99024 POSTOP FOLLOW-UP VISIT: CPT

## 2021-05-25 ENCOUNTER — OFFICE VISIT (OUTPATIENT)
Dept: PHYSICAL THERAPY | Facility: REHABILITATION | Age: 51
End: 2021-05-25
Payer: OTHER MISCELLANEOUS

## 2021-05-25 DIAGNOSIS — Z96.652 S/P REVISION OF TOTAL KNEE, LEFT: Primary | ICD-10-CM

## 2021-05-25 DIAGNOSIS — Z96.652 CHRONIC KNEE PAIN AFTER TOTAL REPLACEMENT OF LEFT KNEE JOINT: ICD-10-CM

## 2021-05-25 DIAGNOSIS — G89.29 CHRONIC KNEE PAIN AFTER TOTAL REPLACEMENT OF LEFT KNEE JOINT: ICD-10-CM

## 2021-05-25 DIAGNOSIS — M25.562 CHRONIC KNEE PAIN AFTER TOTAL REPLACEMENT OF LEFT KNEE JOINT: ICD-10-CM

## 2021-05-25 PROCEDURE — 97140 MANUAL THERAPY 1/> REGIONS: CPT | Performed by: PHYSICAL THERAPIST

## 2021-05-25 PROCEDURE — 97110 THERAPEUTIC EXERCISES: CPT | Performed by: PHYSICAL THERAPIST

## 2021-05-25 PROCEDURE — 97112 NEUROMUSCULAR REEDUCATION: CPT | Performed by: PHYSICAL THERAPIST

## 2021-05-25 NOTE — PROGRESS NOTES
Daily Note     Today's date: 2021  Patient name: Laisha Lopez  : 1970  MRN: [id number]  Referring provider: Monique Carty MD  Dx:   Encounter Diagnosis     ICD-10-CM    1  S/P revision of total knee, left  Z96 652    2  Chronic knee pain after total replacement of left knee joint  M25 562     G89 29     Z96 652        Start Time: 1650  Stop Time: 1740  Total time in clinic (min): 50 minutes    Subjective: Patient had follow up appointment with MD yesterday stating "he wasn't happy with the extension but he knows I started PT late " Patient reports he is going to be fitted for a dynasplint to assist with knee extension  He follows up with MD in one month  Objective: See treatment diary below    Post- Treatment  BP: 105/52 mm Hg (difficult reading due to HR)  HR: 144 bpm  O2 saturation: 98%        Assessment: Tolerated treatment fair  Trialed prone PROM this visit with fair tolerance, continues to be significantly limited into knee extension  Also trialed IASTM to left hamstrings due to increased tightness/contraction but poor tolerance noted  Active knee extension ~25* post-treatment which is improved from previous visits  At end of session patient reports increased HR, he notes this was an issue following his first knee surgery and this most recent surgery  He was given medication to control these episodes but forgot to take it today  Monitored patient's vitals with patient presenting tachycardic, slightly diaphoretic but denies lightheadedness or chest pain  Advised patient to go to ER to address these symptoms  Patient opted to return home to take medication  Spoke with patient on the phone 30 minutes after session and he reports HR has returned to a normal level for him at 98 bpm  Patient demonstrated fatigue post treatment, exhibited good technique with therapeutic exercises and would benefit from continued PT  Plan: Continue per plan of care            Precautions: see protocol, HTN, High cholesterol     * Indicates part of HEP     Daily Treatment Diary     Manuals 5/13 5/19 5/21 5/25         PROM Left knee 8' 10' EOT 8'          Patellar mobilizations  Gr I-II 5' Gr I-II poor tolerance 5'          Prone knee extension w distraction   nv Done 15' total         Prone passive flexion stretching    Done         IASTM L distal HS/proximal gastroc    Done          Neuro Re-ed             Quad sets 10x3" pillow 10x3" pillow heel Bolster 2x10 2x10 pillow heel                      Heel slides w strap 10x5" 10x5" PT assist 10x5"  home         Seated heel slides w overpressure             Gastroc stretch strap 10x10"            Hamstring stretch nv 4" 5x20" 4" 3x30"          Hamstring/gastroc stretch w strap    4" 10x10"         Prone quad set    Poor tolerance         Forward lunge on step   nv          Therapeutic Exercise             Bike    Rocks 5'                      SLR flexion w/ quad set             SLR hip abduction              Bridging              Clamshells    nv         Heel raises             Seated knee extension hangs nv  10' (1'-2' intervals) bw home                      TKE TB    OTB 2x10x5"         TKE ball                          Therapeutic Activity             Leg press              Step ups             Lateral step ups             STS                          Gait training w RW nv            Gait training w SPC  Done 5'           Modalities             CP PRN

## 2021-05-26 ENCOUNTER — OFFICE VISIT (OUTPATIENT)
Dept: PHYSICAL THERAPY | Facility: REHABILITATION | Age: 51
End: 2021-05-26
Payer: OTHER MISCELLANEOUS

## 2021-05-26 DIAGNOSIS — M25.562 CHRONIC KNEE PAIN AFTER TOTAL REPLACEMENT OF LEFT KNEE JOINT: ICD-10-CM

## 2021-05-26 DIAGNOSIS — Z96.652 CHRONIC KNEE PAIN AFTER TOTAL REPLACEMENT OF LEFT KNEE JOINT: ICD-10-CM

## 2021-05-26 DIAGNOSIS — Z96.652 S/P REVISION OF TOTAL KNEE, LEFT: Primary | ICD-10-CM

## 2021-05-26 DIAGNOSIS — G89.29 CHRONIC KNEE PAIN AFTER TOTAL REPLACEMENT OF LEFT KNEE JOINT: ICD-10-CM

## 2021-05-26 LAB
CULTURE RESULTS: SIGNIFICANT CHANGE UP
SPECIMEN SOURCE: SIGNIFICANT CHANGE UP

## 2021-05-26 PROCEDURE — 97112 NEUROMUSCULAR REEDUCATION: CPT

## 2021-05-26 PROCEDURE — 97110 THERAPEUTIC EXERCISES: CPT

## 2021-05-26 PROCEDURE — 97140 MANUAL THERAPY 1/> REGIONS: CPT

## 2021-05-26 NOTE — PROGRESS NOTES
Daily Note     Today's date: 2021  Patient name: Karen Arora  : 1970  MRN: [id number]  Referring provider: Cheryl Jack MD  Dx:   Encounter Diagnosis     ICD-10-CM    1  S/P revision of total knee, left  Z96 652    2  Chronic knee pain after total replacement of left knee joint  M25 562     G89 29     Z96 652        Start Time: 930  Stop Time: 1020  Total time in clinic (min): 50 minutes    Subjective: Patient reporting increased knee soreness at start of session stating "I think it's the weather too " He reports feeling better since yesterdays session, medication taken, no dizziness noted  Objective: See treatment diary below      Assessment: Tolerated treatment fair  Patient demonstrated fatigue post treatment, exhibited good technique with therapeutic exercises and would benefit from continued PT Patient continues to be significantly limited with knee extension this session, however slight improvements noted after manuals  Definite tenderness and restrictions noted with completion of IASTM to distal hamstring/proximal quad with guarding noted, improved tolerance to STM/triggerpoint  Plan: Continue per plan of care  Progress treatment as tolerated             Precautions: see protocol, HTN, High cholesterol     * Indicates part of HEP     Daily Treatment Diary     Manuals         PROM Left knee 8' 10' EOT 8'          Patellar mobilizations  Gr I-II 5' Gr I-II poor tolerance 5'          Prone knee extension w distraction   nv Done 15' total Done 15' total         Prone passive flexion stretching    Done Done        IASTM L distal HS/proximal gastroc    Done  Done        Neuro Re-ed             Quad sets 10x3" pillow 10x3" pillow heel Bolster 2x10 2x10 pillow heel 2x10 pillow heel                     Heel slides w strap 10x5" 10x5" PT assist 10x5"  home         Seated heel slides w overpressure             Gastroc stretch strap 10x10"            Hamstring stretch nv 4" 5x20" 4" 3x30"          Hamstring/gastroc stretch w strap    4" 10x10" 4'' 10x10''        Prone quad set    Poor tolerance         Forward lunge on step   nv          Therapeutic Exercise             Bike    Rocks 5' Rocks 5'                     SLR flexion w/ quad set             SLR hip abduction              Bridging              Clamshells    nv 2x10        Heel raises             Seated knee extension hangs nv  10' (1'-2' intervals) bw home                      TKE TB    OTB 2x10x5" OTB 2x10x5''        TKE ball                          Therapeutic Activity             Leg press              Step ups             Lateral step ups             STS                          Gait training w RW nv            Gait training w SPC  Done 5'           Modalities             CP PRN

## 2021-05-27 NOTE — HISTORY OF PRESENT ILLNESS
[de-identified] : 51 year old male status post revision left total knee 4 weeks ago presents to the office for ROM check.

## 2021-05-27 NOTE — PHYSICAL EXAM
[de-identified] : Left Knee\par Inspection: no effusion or erythema.\par Wounds: well healed incision , clean and dry \par Alignment: normal.\par Palpation: no specific tenderness on palpation\par ROM: 20 degree flexion contracture, flexion to 90 degrees\par Ligamentous laxity: all ligaments appear stable\par Muscle Test: good quad strength.\par Leg examination: calf is soft and non-tender.\par

## 2021-05-27 NOTE — ASSESSMENT
[FreeTextEntry1] : S/p revision LTK 4 weeks ago comes in for ROM check. His flexion wad dine but extension was poor. He was instructed to keep a rolled towel under his ankle, not behind the knee. And a Vanessa splint was ordered. He can f/u in a few weeks.

## 2021-05-28 ENCOUNTER — OFFICE VISIT (OUTPATIENT)
Dept: PHYSICAL THERAPY | Facility: REHABILITATION | Age: 51
End: 2021-05-28
Payer: OTHER MISCELLANEOUS

## 2021-05-28 DIAGNOSIS — M25.562 CHRONIC KNEE PAIN AFTER TOTAL REPLACEMENT OF LEFT KNEE JOINT: ICD-10-CM

## 2021-05-28 DIAGNOSIS — G89.29 CHRONIC KNEE PAIN AFTER TOTAL REPLACEMENT OF LEFT KNEE JOINT: ICD-10-CM

## 2021-05-28 DIAGNOSIS — Z96.652 S/P REVISION OF TOTAL KNEE, LEFT: Primary | ICD-10-CM

## 2021-05-28 DIAGNOSIS — Z96.652 CHRONIC KNEE PAIN AFTER TOTAL REPLACEMENT OF LEFT KNEE JOINT: ICD-10-CM

## 2021-05-28 PROCEDURE — 97110 THERAPEUTIC EXERCISES: CPT

## 2021-05-28 PROCEDURE — 97140 MANUAL THERAPY 1/> REGIONS: CPT

## 2021-05-28 PROCEDURE — 97112 NEUROMUSCULAR REEDUCATION: CPT

## 2021-05-28 NOTE — PROGRESS NOTES
Daily Note     Today's date: 2021  Patient name: Jaclyn Tran  : 1970  MRN: [id number]  Referring provider: Janalyn Oppenheim, MD  Dx:   Encounter Diagnosis     ICD-10-CM    1  S/P revision of total knee, left  Z96 652    2  Chronic knee pain after total replacement of left knee joint  M25 562     G89 29     Z96 652        Start Time: 930  Stop Time: 1025  Total time in clinic (min): 55 minutes    Subjective: Patient reporting knee as "sore" at start of session  He reports no complaints after previous session, only soreness  Objective: See treatment diary below      Assessment: Tolerated treatment fair  Patient demonstrated fatigue post treatment, exhibited good technique with therapeutic exercises and would benefit from continued PT Patient able to achieve approx -20 degrees knee extension with overpressure from therapist this session  Tenderness continue to be noted with STM to hamstring/gastroc, however improved tolerance since previous session       Plan: Continue per plan of care  Progress treatment as tolerated             Precautions: see protocol, HTN, High cholesterol     * Indicates part of HEP     Daily Treatment Diary     Manuals        PROM Left knee 8' 10' EOT 8'          Patellar mobilizations  Gr I-II 5' Gr I-II poor tolerance 5'          Prone knee extension w distraction   nv Done 15' total Done 15' total  Done 5' total       Prone passive flexion stretching    Done Done done       IASTM L distal HS/proximal gastroc    Done  Done STM done       Neuro Re-ed             Quad sets 10x3" pillow 10x3" pillow heel Bolster 2x10 2x10 pillow heel 2x10 pillow heel 2x10 pillow heel                    Heel slides w strap 10x5" 10x5" PT assist 10x5"  home         Seated heel slides w overpressure             Gastroc stretch strap 10x10"            Hamstring stretch nv 4" 5x20" 4" 3x30"          Hamstring/gastroc stretch w strap    4" 10x10" 4'' 10x10'' 4'' 10x10'' Prone quad set    Poor tolerance         Forward lunge on step   nv          Therapeutic Exercise             Bike    Rocks 5' Rocks 5' rocks 8'                    SLR flexion w/ quad set             SLR hip abduction              Bridging              Clamshells    nv 2x10 3x10       Heel raises             Seated knee extension hangs nv  10' (1'-2' intervals) bw home                      TKE TB    OTB 2x10x5" OTB 2x10x5'' OTB 3x10x5''       TKE ball                          Therapeutic Activity             Leg press              Step ups             Lateral step ups             STS                          Gait training w RW nv            Gait training w SPC  Done 5'           Modalities             CP PRN

## 2021-06-01 ENCOUNTER — OFFICE VISIT (OUTPATIENT)
Dept: PHYSICAL THERAPY | Facility: REHABILITATION | Age: 51
End: 2021-06-01
Payer: OTHER MISCELLANEOUS

## 2021-06-01 DIAGNOSIS — G89.29 CHRONIC KNEE PAIN AFTER TOTAL REPLACEMENT OF LEFT KNEE JOINT: ICD-10-CM

## 2021-06-01 DIAGNOSIS — M25.562 CHRONIC KNEE PAIN AFTER TOTAL REPLACEMENT OF LEFT KNEE JOINT: ICD-10-CM

## 2021-06-01 DIAGNOSIS — Z96.652 CHRONIC KNEE PAIN AFTER TOTAL REPLACEMENT OF LEFT KNEE JOINT: ICD-10-CM

## 2021-06-01 DIAGNOSIS — Z96.652 S/P REVISION OF TOTAL KNEE, LEFT: Primary | ICD-10-CM

## 2021-06-01 PROCEDURE — 97110 THERAPEUTIC EXERCISES: CPT | Performed by: PHYSICAL THERAPIST

## 2021-06-01 PROCEDURE — 97140 MANUAL THERAPY 1/> REGIONS: CPT | Performed by: PHYSICAL THERAPIST

## 2021-06-01 PROCEDURE — 97112 NEUROMUSCULAR REEDUCATION: CPT | Performed by: PHYSICAL THERAPIST

## 2021-06-01 NOTE — PROGRESS NOTES
Daily Note     Today's date: 2021  Patient name: Laisha Lopez  : 1970  MRN: [id number]  Referring provider: Monique Carty MD  Dx:   Encounter Diagnosis     ICD-10-CM    1  S/P revision of total knee, left  Z96 652    2  Chronic knee pain after total replacement of left knee joint  M25 562     G89 29     Z96 652        Start Time: 930  Stop Time: 1020  Total time in clinic (min): 50 minutes    Subjective: Patient reports compliance to HEP  He feels the knee is a little straighter  Objective: See treatment diary below       Assessment: Tolerated treatment fair  Patient lacking ~30* active and passively following manual therapy this session with continued toe touch gait and minimal active extension with ambulation despite cues  Improved quad activation compared to previous visits with SAQ but continues to be 50% limited in motion due to ROM deficits  Good tolerance to quad IASTM this visit for scar tissue mobilization and muscle relaxation  Patient demonstrated fatigue post treatment, exhibited good technique with therapeutic exercises and would benefit from continued PT  Plan: Continue per plan of care            Precautions: see protocol, HTN, High cholesterol     * Indicates part of HEP     Daily Treatment Diary     Manuals       PROM Left knee 8' 10' EOT 8'          Patellar mobilizations  Gr I-II 5' Gr I-II poor tolerance 5'          Prone knee extension w distraction   nv Done 15' total Done 15' total  Done 5' total Done       Prone passive flexion stretching    Done Done done Done      IASTM L distal HS/proximal gastroc    Done  Done STM done Quad Done      Neuro Re-ed             Quad sets 10x3" pillow 10x3" pillow heel Bolster 2x10 2x10 pillow heel 2x10 pillow heel 2x10 pillow heel       SAQ       10 PT assist      LAQ       nv      Heel slides w strap 10x5" 10x5" PT assist 10x5"  home         Seated heel slides w overpressure Hamstring/gastroc stretch w strap    4" 10x10" 4'' 10x10'' 4'' 10x10'' 4" 10x10"      Prone quad set    Poor tolerance         Forward lunge on step   nv          Therapeutic Exercise             Bike    Rocks 5' Rocks 5' rocks 8' Rocks 5'                   SLR flexion w/ quad set             SLR hip abduction              Bridging              Clamshells    nv 2x10 3x10 3x10      Heel raises             Seated knee extension hangs nv  10' (1'-2' intervals) bw home                      TKE TB    OTB 2x10x5" OTB 2x10x5'' OTB 3x10x5'' OTB 2x10x5"      TKE ball                          Therapeutic Activity             Leg press              Step ups             Lateral step ups             STS                          Gait training w RW nv            Gait training w SPC  Done 5'           Modalities             CP PRN

## 2021-06-03 ENCOUNTER — OFFICE VISIT (OUTPATIENT)
Dept: PHYSICAL THERAPY | Facility: REHABILITATION | Age: 51
End: 2021-06-03
Payer: OTHER MISCELLANEOUS

## 2021-06-03 DIAGNOSIS — Z96.652 CHRONIC KNEE PAIN AFTER TOTAL REPLACEMENT OF LEFT KNEE JOINT: ICD-10-CM

## 2021-06-03 DIAGNOSIS — M25.562 CHRONIC KNEE PAIN AFTER TOTAL REPLACEMENT OF LEFT KNEE JOINT: ICD-10-CM

## 2021-06-03 DIAGNOSIS — G89.29 CHRONIC KNEE PAIN AFTER TOTAL REPLACEMENT OF LEFT KNEE JOINT: ICD-10-CM

## 2021-06-03 DIAGNOSIS — Z96.652 S/P REVISION OF TOTAL KNEE, LEFT: Primary | ICD-10-CM

## 2021-06-03 PROCEDURE — 97140 MANUAL THERAPY 1/> REGIONS: CPT | Performed by: PHYSICAL THERAPIST

## 2021-06-03 PROCEDURE — 97112 NEUROMUSCULAR REEDUCATION: CPT | Performed by: PHYSICAL THERAPIST

## 2021-06-03 PROCEDURE — 97110 THERAPEUTIC EXERCISES: CPT | Performed by: PHYSICAL THERAPIST

## 2021-06-03 NOTE — PROGRESS NOTES
Daily Note     Today's date: 6/3/2021  Patient name: Willa Bright  : 1970  MRN: [id number]  Referring provider: Jae Calles MD  Dx:   Encounter Diagnosis     ICD-10-CM    1  S/P revision of total knee, left  Z96 652    2  Chronic knee pain after total replacement of left knee joint  M25 562     G89 29     Z96 652        Start Time: 930  Stop Time: 1015  Total time in clinic (min): 45 minutes    Subjective: Patient reporting knee as "sore" at start of session, reporting no complaints after previous session  He states he is to receive his splint later today  Objective: See treatment diary below      Assessment: Tolerated treatment well  Lacking approximately 25* knee extension following manual therapy this visit  Patient ambulating without cane throughout session with no LOB but antalgic gait noted with short left stance time due to lack of TKE  Improved quadriceps activation with TKE against ball vs TB due to increased stability, may benefit from trial of NMES next visit for additional quadriceps activation  Patient demonstrated fatigue post treatment, exhibited good technique with therapeutic exercises and would benefit from continued PT  Plan: Continue per plan of care  Progress treatment as tolerated             Precautions: see protocol, HTN, High cholesterol     * Indicates part of HEP     Daily Treatment Diary     Manuals 5/13 5/19 5/21 5/25 5/26 5/28 6/1 6/3    PROM Left knee 8' 10' EOT 8'         Patellar mobilizations  Gr I-II 5' Gr I-II poor tolerance 5'         Prone knee extension w distraction   nv Done 15' total Done 15' total  Done 5' total Done  Done    Prone passive flexion stretching    Done Done done Done Done    IASTM L distal HS/proximal gastroc    Done  Done STM done Quad Done     Side-lying hip flexor stretch        Done 15' total    Neuro Re-ed            Quad sets 10x3" pillow 10x3" pillow heel Bolster 2x10 2x10 pillow heel 2x10 pillow heel 2x10 pillow heel      SAQ 10 PT assist     LAQ       nv 3x10    Heel slides w strap 10x5" 10x5" PT assist 10x5"  home        Seated heel slides w overpressure            Hamstring/gastroc stretch w strap    4" 10x10" 4'' 10x10'' 4'' 10x10'' 4" 10x10"     Prone quad set    Poor tolerance        Forward lunge on step   nv         Therapeutic Exercise            Bike    Rocks 5' Rocks 5' rocks 8' Rocks 5' Rocks 5' inc time nv                SLR flexion w/ quad set            SLR hip abduction             Bridging         2x10    Clamshells    nv 2x10 3x10 3x10 3x12 TB nv    Heel raises            Seated knee extension hangs nv  10' (1'-2' intervals) bw home                    TKE TB    OTB 2x10x5" OTB 2x10x5'' OTB 3x10x5'' OTB 2x10x5"     TKE ball        2x10x5"                Therapeutic Activity            Leg press             Step ups            Lateral step ups            STS        nv                Gait training w RW nv           Gait training w SPC  Done 5'          Modalities            NMES L quad        nv    CP PRN

## 2021-06-07 ENCOUNTER — OFFICE VISIT (OUTPATIENT)
Dept: PHYSICAL THERAPY | Facility: REHABILITATION | Age: 51
End: 2021-06-07
Payer: OTHER MISCELLANEOUS

## 2021-06-07 ENCOUNTER — OUTPATIENT (OUTPATIENT)
Dept: OUTPATIENT SERVICES | Facility: HOSPITAL | Age: 51
LOS: 1 days | Discharge: HOME | End: 2021-06-07
Payer: OTHER MISCELLANEOUS

## 2021-06-07 DIAGNOSIS — Z96.652 S/P REVISION OF TOTAL KNEE, LEFT: Primary | ICD-10-CM

## 2021-06-07 DIAGNOSIS — Z96.652 CHRONIC KNEE PAIN AFTER TOTAL REPLACEMENT OF LEFT KNEE JOINT: ICD-10-CM

## 2021-06-07 DIAGNOSIS — Z96.652 PRESENCE OF LEFT ARTIFICIAL KNEE JOINT: Chronic | ICD-10-CM

## 2021-06-07 DIAGNOSIS — G89.29 CHRONIC KNEE PAIN AFTER TOTAL REPLACEMENT OF LEFT KNEE JOINT: ICD-10-CM

## 2021-06-07 DIAGNOSIS — M25.562 CHRONIC KNEE PAIN AFTER TOTAL REPLACEMENT OF LEFT KNEE JOINT: ICD-10-CM

## 2021-06-07 DIAGNOSIS — Z98.890 OTHER SPECIFIED POSTPROCEDURAL STATES: Chronic | ICD-10-CM

## 2021-06-07 PROCEDURE — 93970 EXTREMITY STUDY: CPT | Mod: 26

## 2021-06-07 NOTE — PROGRESS NOTES
Daily Note- No Charge Note    Today's date: 2021  Patient name: Deon Barr  : 1970  MRN: [id number]  Referring provider: Tasneem Vogt MD  Dx:   Encounter Diagnosis     ICD-10-CM    1  S/P revision of total knee, left  Z96 652    2  Chronic knee pain after total replacement of left knee joint  M25 562     G89 29     Z96 652                   Subjective: Patient presents to PT in [facility] that he received on Thursday  He reports onset of left leg/calf/foot swelling beginning on Friday and worsening over the weekend  He notes significant calf tightness, tenderness and sharp pain yesterday  Denies constitutional symptoms  Objective: See treatment diary below       Assessment: Patient with visible left lower extremity swelling from knee to foot  Negative quick DF stretch but does report tenderness/sharp pain to palpation of left calf  He also has an area of warmth to the distal left calf but no redness noted  Patient also notes sharp left calf pain with ambulation  Contacted Dr Jamse Sandifer office and spoke to TITA Xie who recommended patient be seen for doppler testing  Patient contacted Dr Jamse Sandifer office to set up an appointment for vascular testing prior to leaving our office  No treatment performed today but will re-initiate POC as appropriate  Plan: Continue per plan of care

## 2021-06-08 ENCOUNTER — NON-APPOINTMENT (OUTPATIENT)
Age: 51
End: 2021-06-08

## 2021-06-09 ENCOUNTER — OFFICE VISIT (OUTPATIENT)
Dept: PHYSICAL THERAPY | Facility: REHABILITATION | Age: 51
End: 2021-06-09
Payer: OTHER MISCELLANEOUS

## 2021-06-09 DIAGNOSIS — Z96.652 CHRONIC KNEE PAIN AFTER TOTAL REPLACEMENT OF LEFT KNEE JOINT: ICD-10-CM

## 2021-06-09 DIAGNOSIS — M25.562 CHRONIC KNEE PAIN AFTER TOTAL REPLACEMENT OF LEFT KNEE JOINT: ICD-10-CM

## 2021-06-09 DIAGNOSIS — G89.29 CHRONIC KNEE PAIN AFTER TOTAL REPLACEMENT OF LEFT KNEE JOINT: ICD-10-CM

## 2021-06-09 DIAGNOSIS — Z96.652 S/P REVISION OF TOTAL KNEE, LEFT: Primary | ICD-10-CM

## 2021-06-09 PROCEDURE — 97112 NEUROMUSCULAR REEDUCATION: CPT | Performed by: PHYSICAL THERAPIST

## 2021-06-09 PROCEDURE — 97110 THERAPEUTIC EXERCISES: CPT | Performed by: PHYSICAL THERAPIST

## 2021-06-09 PROCEDURE — 97140 MANUAL THERAPY 1/> REGIONS: CPT | Performed by: PHYSICAL THERAPIST

## 2021-06-09 PROCEDURE — 97010 HOT OR COLD PACKS THERAPY: CPT | Performed by: PHYSICAL THERAPIST

## 2021-06-09 NOTE — PROGRESS NOTES
Daily Note     Today's date: 2021  Patient name: Federico Chairez  : 1970  MRN: [id number]  Referring provider: Keren Ortega MD  Dx:   Encounter Diagnosis     ICD-10-CM    1  S/P revision of total knee, left  Z96 652    2  Chronic knee pain after total replacement of left knee joint  M25 562     G89 29     Z96 652        Start Time: 930  Stop Time: 1020  Total time in clinic (min): 50 minutes    Subjective: Cedrick reports he had doppler testing on Monday which came back negative  He presents to PT today with continued left lower extremity edema and increased left foot edema  Minimal knee joint swelling  He notes increased lateral joint tenderness/sensitivity since receiving dynasplint last Thursday as well as onset of this edema  Objective: See treatment diary below  Pitting edema 1+ left calf and foot    Assessment: Tolerated treatment well  Held majority of TE this visit due to increase LLE edema  Patient with increased sensitivity to light touch to lateral knee joint, educated patient on beginning desensitization exercises progressing from soft to rougher textures for improved tolerance  Initiated NMES for additional quadriceps strengthening this visit; fair tolerance and palpable quadriceps activation noted  Patient with significant difficulty with ambulation this visit due to increase limitations in knee extension from LLE edema and pain, very short left stance time noted and patient holding onto objects for stability  Advised patient to contact MD if worsening edema and pain noted  Also educated on importance of ankle pumps, icing and elevation over the next few days  Patient demonstrated fatigue post treatment, exhibited good technique with therapeutic exercises and would benefit from continued PT  Plan: Continue per plan of care          Precautions: see protocol, HTN, High cholesterol     * Indicates part of HEP     Daily Treatment Diary     Manuals  6/3 6/9   PROM Left knee 8' 10' EOT 8'      EOT 10'   Patellar mobilizations  Gr I-II 5' Gr I-II poor tolerance 5'         Prone knee extension w distraction   nv Done 15' total Done 15' total  Done 5' total Done  Done    Prone passive flexion stretching    Done Done done Done Done    IASTM L distal HS/proximal gastroc    Done  Done STM done Quad Done     Side-lying hip flexor stretch        Done 15' total nv   Neuro Re-ed            Quad sets 10x3" pillow 10x3" pillow heel [place]ster 2x10 2x10 pillow heel 2x10 pillow heel 2x10 pillow heel      SAQ       10 PT assist     LAQ       nv 3x10 2x10 -45* ext   Heel slides w strap 10x5" 10x5" PT assist 10x5"  home        Seated heel slides w overpressure            Hamstring/gastroc stretch w strap    4" 10x10" 4'' 10x10'' 4'' 10x10'' 4" 10x10"     Prone quad set    Poor tolerance        Forward lunge on step   nv         Therapeutic Exercise            Bike    Rocks 5' Rocks 5' rocks 8' Rocks 5' Rocks 5' inc time nv Rocks 10'               SLR flexion w/ quad set            SLR hip abduction             Bridging         2x10 held   Clamshells    nv 2x10 3x10 3x10 3x12 TB nv held   Heel raises            Seated knee extension hangs nv  10' (1'-2' intervals) bw home                    TKE TB    OTB 2x10x5" OTB 2x10x5'' OTB 3x10x5'' OTB 2x10x5"     TKE ball        2x10x5" held               Therapeutic Activity            Leg press             Step ups            Lateral step ups            STS                        Gait training w RW nv           Gait training w SPC  Done 5'          Modalities            NMES L quad isometrics        nv 15" on  30" off  75 pps  400 us  15' lv 40    CP PRN          15'

## 2021-06-11 ENCOUNTER — OFFICE VISIT (OUTPATIENT)
Dept: PHYSICAL THERAPY | Facility: REHABILITATION | Age: 51
End: 2021-06-11
Payer: OTHER MISCELLANEOUS

## 2021-06-11 DIAGNOSIS — Z96.652 S/P REVISION OF TOTAL KNEE, LEFT: Primary | ICD-10-CM

## 2021-06-11 DIAGNOSIS — G89.29 CHRONIC KNEE PAIN AFTER TOTAL REPLACEMENT OF LEFT KNEE JOINT: ICD-10-CM

## 2021-06-11 DIAGNOSIS — Z96.652 CHRONIC KNEE PAIN AFTER TOTAL REPLACEMENT OF LEFT KNEE JOINT: ICD-10-CM

## 2021-06-11 DIAGNOSIS — M25.562 CHRONIC KNEE PAIN AFTER TOTAL REPLACEMENT OF LEFT KNEE JOINT: ICD-10-CM

## 2021-06-11 PROCEDURE — 97112 NEUROMUSCULAR REEDUCATION: CPT

## 2021-06-11 PROCEDURE — 97140 MANUAL THERAPY 1/> REGIONS: CPT

## 2021-06-11 PROCEDURE — 97110 THERAPEUTIC EXERCISES: CPT

## 2021-06-11 NOTE — PROGRESS NOTES
Daily Note     Today's date: 2021  Patient name: Pio Rojas  : 1970  MRN: [id number]  Referring provider: Liudmila Grullon MD  Dx:   Encounter Diagnosis     ICD-10-CM    1  S/P revision of total knee, left  Z96 652    2  Chronic knee pain after total replacement of left knee joint  M25 562     G89 29     Z96 652        Start Time: 930  Stop Time: 1030  Total time in clinic (min): 60 minutes    Subjective: Patient reports foot swelling has been improving  He states "I feel like it's moving back up to my calf " He reports most discomfort with walking at this time with the calf  Objective: See treatment diary below      Assessment: Tolerated treatment well  Patient demonstrated fatigue post treatment, exhibited good technique with therapeutic exercises and would benefit from continued PT Improvements noted with left LE swelling this session compared to previous, with significant changes noted with foot edema  Patient able to put sneaker on today  Soleus tenderness noted with manuals, relief noted with addition of soleus stretching  Plan: Continue per plan of care  Progress treatment as tolerated          Precautions: see protocol, HTN, High cholesterol     * Indicates part of HEP     Daily Treatment Diary     Manuals 6/11   5/25 5/26 5/28 6/1 6/3 6/9   PROM Left knee 15' EOT         EOT 10'   Patellar mobilizations            Prone knee extension w distraction    Done 15' total Done 15' total  Done 5' total Done  Done    Prone passive flexion stretching    Done Done done Done Done    IASTM L distal HS/proximal gastroc    Done  Done STM done Quad Done     Side-lying hip flexor stretch        Done 15' total nv   STM/TrP soleus done           Neuro Re-ed            Quad sets    2x10 pillow heel 2x10 pillow heel 2x10 pillow heel      SAQ       10 PT assist     LAQ 2x10       nv 3x10 2x10 -45* ext   Heel slides w strap    home        Seated heel slides w overpressure Hamstring/gastroc stretch w strap 4'' 10x10''   4" 10x10" 4'' 10x10'' 4'' 10x10'' 4" 10x10"     Prone quad set    Poor tolerance        Forward lunge on step            Therapeutic Exercise            Bike Rocks 10'   Rocks 5' Rocks 5' rocks 8' Rocks 5' Rocks 5' inc time nv Rocks 10'               SLR flexion w/ quad set            SLR hip abduction             Bridging  2x10       2x10 held   Clamshells GTB 3x10   nv 2x10 3x10 3x10 3x12 TB nv held   Heel raises            Seated knee extension hangs    home                    TKE TB    OTB 2x10x5" OTB 2x10x5'' OTB 3x10x5'' OTB 2x10x5"     TKE ball        2x10x5" held   Soleus stretch standing :10x5           Therapeutic Activity            Leg press             Step ups            Lateral step ups            STS                        Gait training w RW            Gait training w SPC            Modalities            NMES L quad isometrics 15'' on 30'' off 75pps 400 us 10' lv 40       nv 15" on  30" off  75 pps  400 us  15' lv 40    CP PRN          15'

## 2021-06-14 ENCOUNTER — EVALUATION (OUTPATIENT)
Dept: PHYSICAL THERAPY | Facility: REHABILITATION | Age: 51
End: 2021-06-14
Payer: OTHER MISCELLANEOUS

## 2021-06-14 ENCOUNTER — TRANSCRIBE ORDERS (OUTPATIENT)
Dept: PHYSICAL THERAPY | Facility: REHABILITATION | Age: 51
End: 2021-06-14

## 2021-06-14 DIAGNOSIS — G89.29 CHRONIC KNEE PAIN AFTER TOTAL REPLACEMENT OF LEFT KNEE JOINT: ICD-10-CM

## 2021-06-14 DIAGNOSIS — Z96.652 S/P REVISION OF TOTAL KNEE, LEFT: Primary | ICD-10-CM

## 2021-06-14 DIAGNOSIS — Z96.652 CHRONIC KNEE PAIN AFTER TOTAL REPLACEMENT OF LEFT KNEE JOINT: ICD-10-CM

## 2021-06-14 DIAGNOSIS — M25.562 CHRONIC KNEE PAIN AFTER TOTAL REPLACEMENT OF LEFT KNEE JOINT: ICD-10-CM

## 2021-06-14 DIAGNOSIS — Z96.652 STATUS POST REVISION OF TOTAL KNEE REPLACEMENT, LEFT: Primary | ICD-10-CM

## 2021-06-14 PROCEDURE — 97140 MANUAL THERAPY 1/> REGIONS: CPT

## 2021-06-14 PROCEDURE — 97112 NEUROMUSCULAR REEDUCATION: CPT

## 2021-06-14 PROCEDURE — 97110 THERAPEUTIC EXERCISES: CPT

## 2021-06-14 NOTE — LETTER
2021    Fly Valdez MD  [phone] [id number]    Patient: Federico Chairez   YOB: 1970   Date of Visit: 2021     Encounter Diagnosis     ICD-10-CM    1  S/P revision of total knee, left  Z96 652    2  Chronic knee pain after total replacement of left knee joint  M25 562     G89 29     [id number]        Dear Dr Mayank Villasenor: Thank you for your recent referral of Federico Chairez  Please review the attached evaluation summary from Cedrick's recent visit  Please verify that you agree with the plan of care by signing the attached order  If you have any questions or concerns, please do not hesitate to call  I sincerely appreciate the opportunity to share in the care of one of your patients and hope to have another opportunity to work with you in the near future  Sincerely,    Jesus Benavides, PT      Referring Provider:      I certify that I have read the below Plan of Care and certify the need for these services furnished under this plan of treatment while under my care  Fly Valdez MD  [phone]  Via Fax: [phone]          Daily Note     Today's date: 2021  Patient name: Federico Chairez  : 1970  MRN: [id number]  Referring provider: Keren Ortega MD  Dx:   Encounter Diagnosis     ICD-10-CM    1  S/P revision of total knee, left  Z96 652    2  Chronic knee pain after total replacement of left knee joint  M25 562     G89 29     Z96 652        Start Time: 0930  Stop Time: 1030  Total time in clinic (min): 60 minutes    Subjective: Patient reports knee as "still hurts but the swelling is better " He reports going to store yesterday with increased calf pain noted with walking, relief with seated rest break  Patient also reporting quad tightness/tenderness  He reports no complaints after previous session  Objective: See treatment diary below      Assessment: Tolerated treatment well   Patient demonstrated fatigue post treatment, exhibited good technique with therapeutic exercises and would benefit from continued PT See re-eval     Plan: Continue per plan of care  Progress treatment as tolerated         Precautions: see protocol, HTN, High cholesterol     * Indicates part of HEP     Daily Treatment Diary     Manuals 6/11 6/14  5/25 5/26 5/28 6/1 6/3 6/9   PROM Left knee 15' EOT  15' EOT       EOT 10'   Patellar mobilizations            Prone knee extension w distraction    Done 15' total Done 15' total  Done 5' total Done  Done    Prone passive flexion stretching    Done Done done Done Done    IASTM L distal HS/proximal gastroc    Done  Done STM done Quad Done     Side-lying hip flexor stretch        Done 15' total nv   STM/TrP soleus done Done and quad          Neuro Re-ed            Quad sets    2x10 pillow heel 2x10 pillow heel 2x10 pillow heel      SAQ       10 PT assist     LAQ 2x10  2x10     nv 3x10 2x10 -45* ext   Heel slides w strap    home        Seated heel slides w overpressure            Hamstring/gastroc stretch w strap 4'' 10x10'' 4'' 10x10''  4" 10x10" 4'' 10x10'' 4'' 10x10'' 4" 10x10"     Prone quad set    Poor tolerance        Forward lunge on step            Therapeutic Exercise            Bike Rocks 10' Rocks 10'  Rocks 5' Rocks 5' rocks 8' Rocks 5' Rocks 5' inc time nv Rocks 10'               SLR flexion w/ quad set            SLR hip abduction             Bridging  2x10 2x10      2x10 held   Clamshells GTB 3x10 GTB 3x10  nv 2x10 3x10 3x10 3x12 TB nv held   Heel raises            Seated knee extension hangs    home                    TKE TB    OTB 2x10x5" OTB 2x10x5'' OTB 3x10x5'' OTB 2x10x5"     TKE ball        2x10x5" held   Soleus stretch standing :10x5 :10x5          Therapeutic Activity            Leg press             Step ups            Lateral step ups            STS                        Gait training w RW            Gait training w SPC            Modalities            NMES L quad isometrics 15'' on 30'' off 75pps 400 us 10' lv 40       nv 15" on  30" off  75 pps  400 us  15' lv 40    CP PRN          15'                PT Re-Evaluation     Today's date: 2021  Patient name: Anderson Hebert  : 1970  MRN: [id number]  Referring provider: Marilyn Wetzel MD  Dx:   Encounter Diagnosis     ICD-10-CM    1  S/P revision of total knee, left  Z96 652    2  Chronic knee pain after total replacement of left knee joint  M25 562     G89 29     Z96 652        Start Time: 930          Assessment  Assessment details: Rupal Schaffer has made the following improvements since beginning PT: decreased pain, increased ROM, increased strength and increased tolerance to activities  Rupal Schaffer has demonstrated good improvement in left knee flexion range of motion and quadriceps strength  However, patient continues to significantly lack left knee extension limiting normal ambulation/strengthening  Rupal Schaffer continues to be limited with functional activities such as stair negotiation, ambulation and playing with his son due to these continued deficits  Some limitation in progress due to recent LLE swelling limiting motion and ambulation  Rupal Schaffer continues to present with the impairments as listed above  Patient would continue to benefit from skilled PT to address these deficits and to maximize function  Impairments: abnormal gait, abnormal or restricted ROM, impaired balance, impaired physical strength, lacks appropriate home exercise program and pain with function    Goals  Short-Term Goals:   1  Patient will report a decrease in pain by 25%  (met)  2  Patient will demonstrate improved knee ROM to at least 90* in 4 weeks  (met)  3  Patient will demonstrate improved LLE strength by at least 1-2 grades  (progressing)      Long-Term Goals:   1  Patient will demonstrate at least 125 deg knee flexion for improved gait and stair climbing  (progressing)  2   Patient will improve TUG score to at least 10" for decreased risk of falls  (progressing)  3  Patient will demonstrate 50% improvement in knee extension ROM for more normalized gait pattern with LRAD  (progressing)  4  Patient independent with HEP at time of discharge  (progressing)    Plan  Plan details: Thank you for opportunity to participate in the care of [address]  Patient would benefit from: skilled physical therapy  Planned modality interventions: cryotherapy and unattended electrical stimulation  Planned therapy interventions: balance, functional ROM exercises, home exercise program, therapeutic activities, therapeutic exercise, stretching, strengthening, patient education, neuromuscular re-education, manual therapy and joint mobilization  Frequency: 3x week  Duration in visits: 16  Plan of Care beginning date: 2021  Treatment plan discussed with: patient        Subjective Evaluation    History of Present Illness  Mechanism of injury: Jose Elias Norton has been seen for a total of 11 visits for OP PT s/p left TKA revision performed on 2021  Patient rates overall improvement since beginning PT 60%  Patient's global rating of change is " Somewhat better (3) " Patient reports improvements with bending the knee, pain and walking  Patient reports most difficulty with walking, prolonged standing and straightening the knee  He is unable to walk for 10 minutes with biggest complaint of stabbing pain in left calf  Goals: walk straighter, play catch with son      Pain  Current pain ratin  At best pain ratin  At worst pain rating: 10  Quality: sharp and dull ache  Relieving factors: ice, medications, rest and relaxation  Aggravating factors: standing, walking and stair climbing    Social Support  Steps to enter house: yes ([address] [facility] [facility])  Stairs in house: yes   Lives in: multiple-level home  Lives with: alone    Employment status: not working  Treatments  Current treatment: physical therapy  Patient Goals  Patient goals for therapy: decreased edema, decreased pain, improved balance, increased motion, increased strength, independence with ADLs/IADLs, return to sport/leisure activities and return to work          Objective     Observations   Left Knee   Positive for edema and incision  Additional Observation Details  Incision: well healed  LLE edema 1+    Neurological Testing     Sensation     Knee   Left Knee   Hypersensation: light touch    Right Knee   Intact: light touch     Comments   Left light touch: L3-S2  Active Range of Motion   Left Knee   Flexion: 100 degrees with pain  Extension: -35 degrees with pain    Right Knee   Flexion: WFL  Extension: WFL    Passive Range of Motion   Left Knee   Extension: -25 degrees with pain    Right Knee   Flexion: WFL  Extension: [facility]    Mobility   Patellar Mobility:   Left Knee   WFL: inferior  Hypomobile: left medial, left lateral and left superior    Strength/Myotome Testing     Left Hip   Planes of Motion   Flexion: 2+    Right Hip   Planes of Motion   Flexion: 5    Left Knee   Extension: 3-    Right Knee   Flexion: 5  Extension: 5    General Comments:      Knee Comments  RE 6/14/2021:  TUG w no AD: 27"  Gait observation: no AD, slowed speed continues to significantly lack TKE with LLE knee flexion contracture  Toe contact and short left stance time noted      TUG w AD: 30"      Gait observation: slowed speed w RW, mild trunk flexion, toe contact with LLE with knee flexion contracture, lacking TKE

## 2021-06-14 NOTE — PROGRESS NOTES
Daily Note     Today's date: 2021  Patient name: Bria Guaman  : 1970  MRN: [id number]  Referring provider: Valentino Fordyce, MD  Dx:   Encounter Diagnosis     ICD-10-CM    1  S/P revision of total knee, left  Z96 652    2  Chronic knee pain after total replacement of left knee joint  M25 562     G89 29     Z96 652        Start Time: 930  Stop Time: 1030  Total time in clinic (min): 60 minutes    Subjective: Patient reports knee as "still hurts but the swelling is better " He reports going to store yesterday with increased calf pain noted with walking, relief with seated rest break  Patient also reporting quad tightness/tenderness  He reports no complaints after previous session  Objective: See treatment diary below      Assessment: Tolerated treatment well  Patient demonstrated fatigue post treatment, exhibited good technique with therapeutic exercises and would benefit from continued PT See re-eval     Plan: Continue per plan of care  Progress treatment as tolerated         Precautions: see protocol, HTN, High cholesterol     * Indicates part of HEP     Daily Treatment Diary     Manuals 6/11 6/14  5/25 5/26 5/28 6/1 6/3 6/9   PROM Left knee 15' EOT  15' EOT       EOT 10'   Patellar mobilizations            Prone knee extension w distraction    Done 15' total Done 15' total  Done 5' total Done  Done    Prone passive flexion stretching    Done Done done Done Done    IASTM L distal HS/proximal gastroc    Done  Done STM done Quad Done     Side-lying hip flexor stretch        Done 15' total nv   STM/TrP soleus done Done and quad          Neuro Re-ed            Quad sets    2x10 pillow heel 2x10 pillow heel 2x10 pillow heel      SAQ       10 PT assist     LAQ 2x10  2x10     nv 3x10 2x10 -45* ext   Heel slides w strap    home        Seated heel slides w overpressure            Hamstring/gastroc stretch w strap 4'' 10x10'' 4'' 10x10''  4" 10x10" 4'' 10x10'' 4'' 10x10'' 4" 10x10"     Prone quad set Poor tolerance        Forward lunge on step            Therapeutic Exercise            Bike Rocks 10' Rocks 10'  Rocks 5' Rocks 5' rocks 8' Rocks 5' Rocks 5' inc time nv Rocks 10'               SLR flexion w/ quad set            SLR hip abduction             Bridging  2x10 2x10      2x10 held   Clamshells GTB 3x10 GTB 3x10  nv 2x10 3x10 3x10 3x12 TB nv held   Heel raises            Seated knee extension hangs    home                    TKE TB    OTB 2x10x5" OTB 2x10x5'' OTB 3x10x5'' OTB 2x10x5"     TKE ball        2x10x5" held   Soleus stretch standing :10x5 :10x5          Therapeutic Activity            Leg press             Step ups            Lateral step ups            STS                        Gait training w RW            Gait training w SPC            Modalities            NMES L quad isometrics 15'' on 30'' off 75pps 400 us 10' lv 40       nv 15" on  30" off  75 pps  400 us  15' lv 40    CP PRN          15'

## 2021-06-14 NOTE — PROGRESS NOTES
PT Re-Evaluation     Today's date: 2021  Patient name: Federico Chairez  : 1970  MRN: [id number]  Referring provider: Keren Ortega MD  Dx:   Encounter Diagnosis     ICD-10-CM    1  S/P revision of total knee, left  Z96 652    2  Chronic knee pain after total replacement of left knee joint  M25 562     G89 29     Z96 652        Start Time: 930          Assessment  Assessment details: Kym Fortune has made the following improvements since beginning PT: decreased pain, increased ROM, increased strength and increased tolerance to activities  Kym Tong has demonstrated good improvement in left knee flexion range of motion and quadriceps strength  However, patient continues to significantly lack left knee extension limiting normal ambulation/strengthening  Kym Tong continues to be limited with functional activities such as stair negotiation, ambulation and playing with his son due to these continued deficits  Some limitation in progress due to recent LLE swelling limiting motion and ambulation  Kym Tong continues to present with the impairments as listed above  Patient would continue to benefit from skilled PT to address these deficits and to maximize function  Impairments: abnormal gait, abnormal or restricted ROM, impaired balance, impaired physical strength, lacks appropriate home exercise program and pain with function    Goals  Short-Term Goals:   1  Patient will report a decrease in pain by 25%  (met)  2  Patient will demonstrate improved knee ROM to at least 90* in 4 weeks  (met)  3  Patient will demonstrate improved LLE strength by at least 1-2 grades  (progressing)      Long-Term Goals:   1  Patient will demonstrate at least 125 deg knee flexion for improved gait and stair climbing  (progressing)  2  Patient will improve TUG score to at least 10" for decreased risk of falls  (progressing)  3  Patient will demonstrate 50% improvement in knee extension ROM for more normalized gait pattern with LRAD  (progressing)  4  Patient independent with HEP at time of discharge  (progressing)    Plan  Plan details: Thank you for opportunity to participate in the care of [address]  Patient would benefit from: skilled physical therapy  Planned modality interventions: cryotherapy and unattended electrical stimulation  Planned therapy interventions: balance, functional ROM exercises, home exercise program, therapeutic activities, therapeutic exercise, stretching, strengthening, patient education, neuromuscular re-education, manual therapy and joint mobilization  Frequency: 3x week  Duration in visits: 16  Plan of Care beginning date: 2021  Treatment plan discussed with: patient        Subjective Evaluation    History of Present Illness  Mechanism of injury: Riccardo Kendrick has been seen for a total of 11 visits for OP PT s/p left TKA revision performed on 2021  Patient rates overall improvement since beginning PT 60%  Patient's global rating of change is " Somewhat better (3) " Patient reports improvements with bending the knee, pain and walking  Patient reports most difficulty with walking, prolonged standing and straightening the knee  He is unable to walk for 10 minutes with biggest complaint of stabbing pain in left calf  Goals: walk straighter, play catch with son      Pain  Current pain ratin  At best pain ratin  At worst pain rating: 10  Quality: sharp and dull ache  Relieving factors: ice, medications, rest and relaxation  Aggravating factors: standing, walking and stair climbing    Social Support  Steps to enter house: yes ([facility])  Stairs in house: yes   Lives in: multiple-level home  Lives with: alone    Employment status: not working  Treatments  Current treatment: physical therapy  Patient Goals  Patient goals for therapy: decreased edema, decreased pain, improved balance, increased motion, increased strength, independence with ADLs/IADLs, return to sport/leisure activities and return to work          Objective     Observations   Left Knee   Positive for edema and incision  Additional Observation Details  Incision: well healed  LLE edema 1+    Neurological Testing     Sensation     Knee   Left Knee   Hypersensation: light touch    Right Knee   Intact: light touch     Comments   Left light touch: L3-S2  Active Range of Motion   Left Knee   Flexion: 100 degrees with pain  Extension: -35 degrees with pain    Right Knee   Flexion: WFL  Extension: WFL    Passive Range of Motion   Left Knee   Extension: -25 degrees with pain    Right Knee   Flexion: [facility]  Extension: [facility]    Mobility   Patellar Mobility:   Left Knee   WFL: inferior  Hypomobile: left medial, left lateral and left superior    Strength/Myotome Testing     Left Hip   Planes of Motion   Flexion: 2+    Right Hip   Planes of Motion   Flexion: 5    Left Knee   Extension: 3-    Right Knee   Flexion: 5  Extension: 5    General Comments:      Knee Comments  RE 6/14/2021:  TUG w no AD: 27"  Gait observation: no AD, slowed speed continues to significantly lack TKE with LLE knee flexion contracture  Toe contact and short left stance time noted      TUG w AD: 30"      Gait observation: slowed speed w RW, mild trunk flexion, toe contact with LLE with knee flexion contracture, lacking TKE

## 2021-06-15 DIAGNOSIS — M79.89 OTHER SPECIFIED SOFT TISSUE DISORDERS: ICD-10-CM

## 2021-06-16 ENCOUNTER — OFFICE VISIT (OUTPATIENT)
Dept: PHYSICAL THERAPY | Facility: REHABILITATION | Age: 51
End: 2021-06-16
Payer: OTHER MISCELLANEOUS

## 2021-06-16 DIAGNOSIS — G89.29 CHRONIC KNEE PAIN AFTER TOTAL REPLACEMENT OF LEFT KNEE JOINT: ICD-10-CM

## 2021-06-16 DIAGNOSIS — M25.562 CHRONIC KNEE PAIN AFTER TOTAL REPLACEMENT OF LEFT KNEE JOINT: ICD-10-CM

## 2021-06-16 DIAGNOSIS — Z96.652 S/P REVISION OF TOTAL KNEE, LEFT: Primary | ICD-10-CM

## 2021-06-16 DIAGNOSIS — Z96.652 CHRONIC KNEE PAIN AFTER TOTAL REPLACEMENT OF LEFT KNEE JOINT: ICD-10-CM

## 2021-06-16 LAB
CULTURE RESULTS: SIGNIFICANT CHANGE UP
SPECIMEN SOURCE: SIGNIFICANT CHANGE UP

## 2021-06-16 PROCEDURE — 97110 THERAPEUTIC EXERCISES: CPT

## 2021-06-16 PROCEDURE — 97140 MANUAL THERAPY 1/> REGIONS: CPT

## 2021-06-16 PROCEDURE — 97112 NEUROMUSCULAR REEDUCATION: CPT

## 2021-06-16 NOTE — PROGRESS NOTES
Daily Note     Today's date: 2021  Patient name: Tyesha Valerio  : 1970  MRN: [id number]  Referring provider: Pennie Rico MD  Dx:   Encounter Diagnosis     ICD-10-CM    1  S/P revision of total knee, left  Z96 652    2  Chronic knee pain after total replacement of left knee joint  M25 562     G89 29     Z96 652        Start Time: 930  Stop Time: 1020  Total time in clinic (min): 50 minutes    Subjective: Patient reporting knee tightness at start of session, also reporting soreness with calf  He reports no complaints after previous session  Objective: See treatment diary below       Assessment: Tolerated treatment well  Patient demonstrated fatigue post treatment, exhibited good technique with therapeutic exercises and would benefit from continued PT Patient had reports of increased calf cramping with completion of standing TKE as well as with any other standing TE  Tenderness/restrictions noted with STM/TrP to gastroc/soleus this session, some relief noted  Plan: Continue per plan of care  Progress treatment as tolerated         Precautions: see protocol, HTN, High cholesterol     * Indicates part of HEP     Daily Treatment Diary     Manuals 6/11 6/14 6/16  5/26 5/28 6/1 6/3 6/9   PROM Left knee 15' EOT  15' EOT 15' EOT      EOT 10'   Patellar mobilizations            Prone knee extension w distraction     Done 15' total  Done 5' total Done  Done    Prone passive flexion stretching     Done done Done Done    IASTM L distal HS/proximal gastroc     Done STM done Quad Done     Side-lying hip flexor stretch        Done 15' total nv   STM/TrP soleus done Done and quad Done and quad         Neuro Re-ed            Quad sets     2x10 pillow heel 2x10 pillow heel      SAQ       10 PT assist     LAQ 2x10  2x10 2x10    nv 3x10 2x10 -45* ext   Heel slides w strap            Seated heel slides w overpressure            Hamstring/gastroc stretch w strap 4'' 10x10'' 4'' 10x10'' 4'' 10x10''  4'' 10x10'' 4'' 10x10'' 4" 10x10"     Prone quad set            Forward lunge on step            Therapeutic Exercise            [place] 10' Rocks 10' Rocks 10'  Rocks 5' rocks 8' Rocks 5' Rocks 5' inc time nv Rocks 10'               SLR flexion w/ quad set            SLR hip abduction             Bridging  2x10 2x10 3x10     2x10 held   Clamshells GTB 3x10 GTB 3x10 GTB 3x12  2x10 3x10 3x10 3x12 TB nv held   Heel raises            Seated knee extension hangs                        TKE TB   OTB 2x10x5''  OTB 2x10x5'' OTB 3x10x5'' OTB 2x10x5"     TKE ball        2x10x5" held   Soleus stretch standing :10x5 :10x5 :10x5         Therapeutic Activity            Leg press             Step ups            Lateral step ups            STS                        Gait training w RW            Gait training w SPC            Modalities            NMES L quad isometrics 15'' on 30'' off 75pps 400 us 10' lv 40       nv 15" on  30" off  75 pps  400 us  15' lv 40    CP PRN          15'

## 2021-06-21 ENCOUNTER — APPOINTMENT (OUTPATIENT)
Dept: PHYSICAL THERAPY | Facility: REHABILITATION | Age: 51
End: 2021-06-21
Payer: OTHER MISCELLANEOUS

## 2021-06-23 ENCOUNTER — APPOINTMENT (OUTPATIENT)
Dept: ORTHOPEDIC SURGERY | Facility: CLINIC | Age: 51
End: 2021-06-23
Payer: OTHER MISCELLANEOUS

## 2021-06-23 ENCOUNTER — APPOINTMENT (OUTPATIENT)
Dept: PHYSICAL THERAPY | Facility: REHABILITATION | Age: 51
End: 2021-06-23
Payer: OTHER MISCELLANEOUS

## 2021-06-23 VITALS — TEMPERATURE: 97.8 F

## 2021-06-23 PROCEDURE — 73562 X-RAY EXAM OF KNEE 3: CPT | Mod: LT

## 2021-06-23 PROCEDURE — 99024 POSTOP FOLLOW-UP VISIT: CPT

## 2021-06-23 NOTE — ASSESSMENT
[FreeTextEntry1] : Incision well healed. There is a small to moderate effusion with a 15-20 degree flexion contracture and 90 degrees of flexion. He is to continue to use the pedro splint. We have asked that he start weening himself of the Oxycodone and f/u in 6 weeks.

## 2021-06-23 NOTE — PHYSICAL EXAM
[de-identified] : Incision well healed. There is a small to moderate effusion with a 15-20 degree flexion contracture and 90 degrees of flexion. He is to continue to use the pedro splint. We have asked that he start weening himself of the Oxycodone and f/u in 6 weeks.  [de-identified] : Radiographs done today AP lateral and skyline of the left knee shows well aligned revision total knee replacement.

## 2021-06-23 NOTE — HISTORY OF PRESENT ILLNESS
[de-identified] : 51 year old male s/p revision left total knee replacement presents to the office today for a follow up. Patient has been using his Vanessa Splint daily. Patient has been taking Oxycodone 5 mg 3x daily along with Gabapentin prescribed by his pain management doctor. Patient reports continuing to do PT 3x per week. PAtient continues to complain of pain.

## 2021-06-24 ENCOUNTER — APPOINTMENT (OUTPATIENT)
Dept: PHYSICAL THERAPY | Facility: REHABILITATION | Age: 51
End: 2021-06-24
Payer: OTHER MISCELLANEOUS

## 2021-06-28 ENCOUNTER — APPOINTMENT (OUTPATIENT)
Dept: PHYSICAL THERAPY | Facility: REHABILITATION | Age: 51
End: 2021-06-28
Payer: OTHER MISCELLANEOUS

## 2021-06-30 ENCOUNTER — OFFICE VISIT (OUTPATIENT)
Dept: PHYSICAL THERAPY | Facility: REHABILITATION | Age: 51
End: 2021-06-30
Payer: OTHER MISCELLANEOUS

## 2021-06-30 DIAGNOSIS — G89.29 CHRONIC KNEE PAIN AFTER TOTAL REPLACEMENT OF LEFT KNEE JOINT: ICD-10-CM

## 2021-06-30 DIAGNOSIS — Z96.652 CHRONIC KNEE PAIN AFTER TOTAL REPLACEMENT OF LEFT KNEE JOINT: ICD-10-CM

## 2021-06-30 DIAGNOSIS — M25.562 CHRONIC KNEE PAIN AFTER TOTAL REPLACEMENT OF LEFT KNEE JOINT: ICD-10-CM

## 2021-06-30 DIAGNOSIS — Z96.652 S/P REVISION OF TOTAL KNEE, LEFT: Primary | ICD-10-CM

## 2021-06-30 PROCEDURE — 97140 MANUAL THERAPY 1/> REGIONS: CPT

## 2021-06-30 PROCEDURE — 97112 NEUROMUSCULAR REEDUCATION: CPT

## 2021-06-30 PROCEDURE — 97110 THERAPEUTIC EXERCISES: CPT

## 2021-06-30 NOTE — PROGRESS NOTES
Daily Note     Today's date: 2021  Patient name: Shannan Galindo  : 1970  MRN: [id number]  Referring provider: Gauri Jennings MD  Dx:   Encounter Diagnosis     ICD-10-CM    1  S/P revision of total knee, left  Z96 652    2  Chronic knee pain after total replacement of left knee joint  M25 562     G89 29     Z96 652        Start Time: 316  Stop Time: 1018  Total time in clinic (min): 53 minutes    Subjective: Patient reporting knee as "alright today" stating he has really been working on knee extension, with evident improvements noted since previous session  Swelling present however improvements also noted  Objective: See treatment diary below      Assessment: Tolerated treatment well  Patient demonstrated fatigue post treatment, exhibited good technique with therapeutic exercises and would benefit from continued PT Improvements noted with knee extension ROM this session, patient able to achieve 18* actively and 10* with overpressure from therapist        Plan: Continue per plan of care  Progress treatment as tolerated         Precautions: see protocol, HTN, High cholesterol     * Indicates part of HEP     Daily Treatment Diary     Manuals 6/11 6/14 6/16 6/30   6/1 6/3 6/9   PROM Left knee 15' EOT  15' EOT 15' EOT 15' EOT/supine     EOT 10'   Patellar mobilizations            Prone knee extension w distraction       Done  Done    Prone passive flexion stretching       Done Done    IASTM L distal HS/proximal gastroc       Quad Done     Side-lying hip flexor stretch        Done 15' total nv   STM/TrP soleus done Done and quad Done and quad Done & quad        Neuro Re-ed            Quad sets            SAQ       10 PT assist     LAQ 2x10  2x10 2x10 2x10   nv 3x10 2x10 -45* ext   Heel slides w strap            Seated heel slides w overpressure            Hamstring/gastroc stretch w strap 4'' 10x10'' 4'' 10x10'' 4'' 10x10'' 4'' 20''x5   4" 10x10"     Prone quad set    nv        Therapeutic Exercise [place] 10' Rocks 10' Rocks 10' 5'   Rocks 5' Rocks 5' inc time nv Rocks 10'               SLR flexion w/ quad set            SLR hip abduction             Bridging  2x10 2x10 3x10 3x10     2x10 held   Clamshells GTB 3x10 GTB 3x10 GTB 3x12 GTB 3x12   3x10 3x12 TB nv held   Heel raises            Seated knee extension hangs    HEP                    TKE TB   OTB 2x10x5''    OTB 2x10x5"     TKE ball    10x5"    2x10x5" held   Soleus stretch standing :10x5 :10x5 :10x5         Standing gastroc stretch    :10x10        Therapeutic Activity            Leg press     nv        Step ups            Lateral step ups            STS                        Gait training w RW            Gait training w SPC            Modalities            NMES L quad isometrics 15'' on 30'' off 75pps 400 us 10' lv 40   nv    nv 15" on  30" off  75 pps  400 us  15' lv 40    CP PRN          15'

## 2021-07-07 ENCOUNTER — OFFICE VISIT (OUTPATIENT)
Dept: PHYSICAL THERAPY | Facility: REHABILITATION | Age: 51
End: 2021-07-07
Payer: OTHER MISCELLANEOUS

## 2021-07-07 DIAGNOSIS — G89.29 CHRONIC KNEE PAIN AFTER TOTAL REPLACEMENT OF LEFT KNEE JOINT: ICD-10-CM

## 2021-07-07 DIAGNOSIS — M25.562 CHRONIC KNEE PAIN AFTER TOTAL REPLACEMENT OF LEFT KNEE JOINT: ICD-10-CM

## 2021-07-07 DIAGNOSIS — Z96.652 S/P REVISION OF TOTAL KNEE, LEFT: Primary | ICD-10-CM

## 2021-07-07 DIAGNOSIS — Z96.652 CHRONIC KNEE PAIN AFTER TOTAL REPLACEMENT OF LEFT KNEE JOINT: ICD-10-CM

## 2021-07-07 PROCEDURE — 97110 THERAPEUTIC EXERCISES: CPT

## 2021-07-07 PROCEDURE — 97140 MANUAL THERAPY 1/> REGIONS: CPT

## 2021-07-07 PROCEDURE — 97112 NEUROMUSCULAR REEDUCATION: CPT

## 2021-07-07 NOTE — PROGRESS NOTES
Daily Note     Today's date: 2021  Patient name: Isabel Monroy  : 1970  MRN: [id number]  Referring provider: Mattie Briceño MD  Dx:   Encounter Diagnosis     ICD-10-CM    1  S/P revision of total knee, left  Z96 652    2  Chronic knee pain after total replacement of left knee joint  M25 562     G89 29     Z96 652        Start Time: 1100  Stop Time: 1155  Total time in clinic (min): 55 minutes    Subjective: Patient reporting knee as "sore, really sore" at start of session  Continues to report calf tightness/cramping/pain when walking stating "its stabbing "       Objective: See treatment diary below      Assessment: Tolerated treatment well  Patient demonstrated fatigue post treatment, exhibited good technique with therapeutic exercises and would benefit from continued PT Trialed prone quad sets and leg press this session with patient tolerating well, no pain reported  Improved tolerance to prone lying this session compared to previous sessions, with some improvements noted with knee ext after quad sets  Patient reporting increased knee pain with completion of bridges this session  Plan: Continue per plan of care  Progress treatment as tolerated         Precautions: see protocol, HTN, High cholesterol     * Indicates part of HEP     Daily Treatment Diary     Manuals 6/11 6/14 6/16 6/30 7/7  6/1 6/3 6/9   PROM Left knee 15' EOT  15' EOT 15' EOT 15' EOT/supine 15' EOT/supine    EOT 10'   Patellar mobilizations            Prone knee extension w distraction       Done  Done    Prone passive flexion stretching       Done Done    IASTM L distal HS/proximal gastroc       Quad Done     Side-lying hip flexor stretch        Done 15' total nv   STM/TrP soleus done Done and quad Done and quad Done & quad Done & quad       Neuro Re-ed            Quad sets            SAQ       10 PT assist     LAQ 2x10  2x10 2x10 2x10 2x10   nv 3x10 2x10 -45* ext   Heel slides w strap            Seated heel slides w overpressure            Hamstring/gastroc stretch w strap 4'' 10x10'' 4'' 10x10'' 4'' 10x10'' 4'' 20''x5 4'' 10x10''  4" 10x10"     Prone quad set    nv 2x10        Therapeutic Exercise            [place] 10' Rocks 10' Rocks 10' 5' 5'  Rocks 5' Rocks 5' inc time nv Rocks 10'               SLR flexion w/ quad set            SLR hip abduction             Bridging  2x10 2x10 3x10 3x10  3x10   2x10 held   Clamshells GTB 3x10 GTB 3x10 GTB 3x12 GTB 3x12 GTB 3x15  3x10 3x12 TB nv held   Heel raises            Seated knee extension hangs    HEP                    TKE TB   OTB 2x10x5''    OTB 2x10x5"     TKE ball    10x5" 20x5''   2x10x5" held   Soleus stretch standing :10x5 :10x5 :10x5         Standing gastroc stretch    :10x10 :10x10       Therapeutic Activity            Leg press     nv 65# 3x10       Step ups            Lateral step ups            STS                        Gait training w RW            Gait training w SPC            Modalities            NMES L quad isometrics 15'' on 30'' off 75pps 400 us 10' lv 40   nv    nv 15" on  30" off  75 pps  400 us  15' lv 40    CP PRN          15'

## 2021-07-09 ENCOUNTER — OFFICE VISIT (OUTPATIENT)
Dept: PHYSICAL THERAPY | Facility: REHABILITATION | Age: 51
End: 2021-07-09
Payer: OTHER MISCELLANEOUS

## 2021-07-09 DIAGNOSIS — Z96.652 S/P REVISION OF TOTAL KNEE, LEFT: Primary | ICD-10-CM

## 2021-07-09 DIAGNOSIS — G89.29 CHRONIC KNEE PAIN AFTER TOTAL REPLACEMENT OF LEFT KNEE JOINT: ICD-10-CM

## 2021-07-09 DIAGNOSIS — Z96.652 CHRONIC KNEE PAIN AFTER TOTAL REPLACEMENT OF LEFT KNEE JOINT: ICD-10-CM

## 2021-07-09 DIAGNOSIS — M25.562 CHRONIC KNEE PAIN AFTER TOTAL REPLACEMENT OF LEFT KNEE JOINT: ICD-10-CM

## 2021-07-09 PROCEDURE — 97140 MANUAL THERAPY 1/> REGIONS: CPT | Performed by: PHYSICAL THERAPIST

## 2021-07-09 PROCEDURE — 97112 NEUROMUSCULAR REEDUCATION: CPT | Performed by: PHYSICAL THERAPIST

## 2021-07-09 PROCEDURE — 97110 THERAPEUTIC EXERCISES: CPT | Performed by: PHYSICAL THERAPIST

## 2021-07-09 NOTE — PROGRESS NOTES
Daily Note     Today's date: 2021  Patient name: Eric Neil  : 1970  MRN: [id number]  Referring provider: Simran Alexandra MD  Dx:   Encounter Diagnosis     ICD-10-CM    1  S/P revision of total knee, left  Z96 652    2  Chronic knee pain after total replacement of left knee joint  M25 562     G89 29     Z96 652        Start Time: 933  Stop Time: 1028  Total time in clinic (min): 55 minutes    Subjective: Patient reports left knee joint soreness at start of session  He notes some knee swelling yesterday but it has gone down  Objective: See treatment diary below      Assessment: Tolerated treatment well  Patient lacking ~10* knee extension pre-treatment and ~8* post manual therapy  However, patient does demonstrate increased knee joint swelling at end of session therefore may regress in terms of repetitions next visit  Good glute and quadriceps engagement with prone quad sets, continues to lack TKE due to limitations in joint mobility  Continued pain with positioning for bridges due to rotational movement, good tolerance to bridging itself  Patient demonstrated fatigue post treatment, exhibited good technique with therapeutic exercises and would benefit from continued PT  Plan: Continue per plan of care        Precautions: see protocol, HTN, High cholesterol     * Indicates part of HEP     Daily Treatment Diary     Manuals    PROM Left knee 15' EOT  15' EOT 15' EOT 15' EOT/supine 15' EOT/supine    EOT 10'   Patellar mobilizations            Prone knee extension w distraction      Done  20' total      Prone passive flexion stretching      Done      IASTM L distal HS/proximal gastroc            Side-lying hip flexor stretch         nv   STM/TrP soleus done Done and quad Done and quad Done & quad Done & quad Done sidelying       Neuro Re-ed            Quad sets            SAQ            LAQ 2x10  2x10 2x10 2x10 2x10     2x10 -45* ext   Heel slides w strap Seated heel slides w overpressure            Hamstring/gastroc stretch w strap 4'' 10x10'' 4'' 10x10'' 4'' 10x10'' 4'' 20''x5 4'' 10x10'' supine 5x10"      Prone quad set    nv 2x10  2x10x5"      Therapeutic Exercise            [place] 10' Rocks 10' Rocks 10' 5' 5' 5' lv 1   Rocks 10'               SLR flexion w/ quad set            SLR hip abduction             Bridging  2x10 2x10 3x10 3x10  3x10 3x10   held   Clamshells GTB 3x10 GTB 3x10 GTB 3x12 GTB 3x12 GTB 3x15 GTB 3x15   held   Heel raises            Seated knee extension hangs    HEP                    TKE TB   OTB 2x10x5''         TKE ball    10x5" 20x5'' 20x5"   held   Soleus stretch standing :10x5 :10x5 :10x5         Standing gastroc stretch    :10x10 :10x10 10x5"      Therapeutic Activity            Leg press     nv 65# 3x10 65# 3x12      Step ups            Lateral step ups            STS                        Gait training w RW            Gait training w SPC            Modalities            NMES L quad isometrics 15'' on 30'' off 75pps 400 us 10' lv 40   nv     15" on  30" off  75 pps  400 us  15' lv 40    CP PRN          15'

## 2021-07-12 ENCOUNTER — APPOINTMENT (OUTPATIENT)
Dept: PHYSICAL THERAPY | Facility: REHABILITATION | Age: 51
End: 2021-07-12
Payer: OTHER MISCELLANEOUS

## 2021-07-13 ENCOUNTER — OFFICE VISIT (OUTPATIENT)
Dept: PHYSICAL THERAPY | Facility: REHABILITATION | Age: 51
End: 2021-07-13
Payer: OTHER MISCELLANEOUS

## 2021-07-13 DIAGNOSIS — Z96.652 CHRONIC KNEE PAIN AFTER TOTAL REPLACEMENT OF LEFT KNEE JOINT: ICD-10-CM

## 2021-07-13 DIAGNOSIS — Z96.652 S/P REVISION OF TOTAL KNEE, LEFT: Primary | ICD-10-CM

## 2021-07-13 DIAGNOSIS — G89.29 CHRONIC KNEE PAIN AFTER TOTAL REPLACEMENT OF LEFT KNEE JOINT: ICD-10-CM

## 2021-07-13 DIAGNOSIS — M25.562 CHRONIC KNEE PAIN AFTER TOTAL REPLACEMENT OF LEFT KNEE JOINT: ICD-10-CM

## 2021-07-13 PROCEDURE — 97110 THERAPEUTIC EXERCISES: CPT

## 2021-07-13 PROCEDURE — 97140 MANUAL THERAPY 1/> REGIONS: CPT

## 2021-07-13 PROCEDURE — 97112 NEUROMUSCULAR REEDUCATION: CPT

## 2021-07-13 NOTE — PROGRESS NOTES
Daily Note     Today's date: 2021  Patient name: Radha Alonso  : 1970  MRN: [id number]  Referring provider: Jennie Church MD  Dx:   Encounter Diagnosis     ICD-10-CM    1  S/P revision of total knee, left  Z96 652    2  Chronic knee pain after total replacement of left knee joint  M25 562     G89 29     Z96 652        Start Time: 1145  Stop Time: 1240  Total time in clinic (min): 55 minutes    Subjective: Patient reporting knee as "not good" at start of session, reporting more pain with knee flexion stating "maybe it's the weather " He also reports more frequent LE buckling stating a few times he had "close calls to falling "       Objective: See treatment diary below      Assessment: Tolerated treatment well  Patient demonstrated fatigue post treatment, exhibited good technique with therapeutic exercises and would benefit from continued PT Increased knee swelling noted at start of session, medial aspect of knee  Patient continues to report increased discomfort if/when slight rotation of left foot is done with completion of bridges  Most discomfort noted with calf muscle during session  Plan: Continue per plan of care  Progress treatment as tolerated         Precautions: see protocol, HTN, High cholesterol     * Indicates part of HEP     Daily Treatment Diary     Manuals    PROM Left knee 15' EOT  15' EOT 15' EOT 15' EOT/supine 15' EOT/supine    EOT 10'   Patellar mobilizations            Prone knee extension w distraction      Done  20' total Done 20' total     Prone passive flexion stretching      Done Done      IASTM L distal HS/proximal gastroc            Side-lying hip flexor stretch         nv   STM/TrP soleus done Done and quad Done and quad Done & quad Done & quad Done sidelying  Done sidelying     Neuro Re-ed            Quad sets            SAQ            LAQ 2x10  2x10 2x10 2x10 2x10     2x10 -45* ext   Heel slides w strap            Seated heel slides w overpressure            Hamstring/gastroc stretch w strap 4'' 10x10'' 4'' 10x10'' 4'' 10x10'' 4'' 20''x5 4'' 10x10'' supine 5x10" Supine 5x20''     Prone quad set    nv 2x10  2x10x5" 2x10x5''     Therapeutic Exercise            [place] 10' Rocks 10' Rocks 10' 5' 5' 5' lv 1 5' lvl 1  Rocks 10'               SLR flexion w/ quad set            SLR hip abduction             Bridging  2x10 2x10 3x10 3x10  3x10 3x10 3x10  held   Clamshells GTB 3x10 GTB 3x10 GTB 3x12 GTB 3x12 GTB 3x15 GTB 3x15 GTB 3x15  held   Heel raises            Seated knee extension hangs    HEP                    TKE TB   OTB 2x10x5''         TKE ball    10x5" 20x5'' 20x5" 20x5''  held   Soleus stretch standing :10x5 :10x5 :10x5         Standing gastroc stretch    :10x10 :10x10 10x5" 10x5''     Therapeutic Activity            Leg press     nv 65# 3x10 65# 3x12 65# 3x15     Step ups            Lateral step ups            STS                        Gait training w RW            Gait training w SPC            Modalities            NMES L quad isometrics 15'' on 30'' off 75pps 400 us 10' lv 40   nv     15" on  30" off  75 pps  400 us  15' lv 40    CP PRN          15'

## 2021-07-14 ENCOUNTER — OFFICE VISIT (OUTPATIENT)
Dept: PHYSICAL THERAPY | Facility: REHABILITATION | Age: 51
End: 2021-07-14
Payer: OTHER MISCELLANEOUS

## 2021-07-14 DIAGNOSIS — G89.29 CHRONIC KNEE PAIN AFTER TOTAL REPLACEMENT OF LEFT KNEE JOINT: ICD-10-CM

## 2021-07-14 DIAGNOSIS — Z96.652 CHRONIC KNEE PAIN AFTER TOTAL REPLACEMENT OF LEFT KNEE JOINT: ICD-10-CM

## 2021-07-14 DIAGNOSIS — M25.562 CHRONIC KNEE PAIN AFTER TOTAL REPLACEMENT OF LEFT KNEE JOINT: ICD-10-CM

## 2021-07-14 DIAGNOSIS — Z96.652 S/P REVISION OF TOTAL KNEE, LEFT: Primary | ICD-10-CM

## 2021-07-14 PROCEDURE — 97110 THERAPEUTIC EXERCISES: CPT

## 2021-07-14 PROCEDURE — 97112 NEUROMUSCULAR REEDUCATION: CPT

## 2021-07-14 PROCEDURE — 97140 MANUAL THERAPY 1/> REGIONS: CPT

## 2021-07-14 NOTE — PROGRESS NOTES
Daily Note     Today's date: 2021  Patient name: Rhona Padron  : 1970  MRN: [id number]  Referring provider: Tavo Higginbotham MD  Dx:   Encounter Diagnosis     ICD-10-CM    1  S/P revision of total knee, left  Z96 652    2  Chronic knee pain after total replacement of left knee joint  M25 562     G89 29     Z96 652        Start Time: 2242  Stop Time: 1300  Total time in clinic (min): 45 minutes    Subjective: Patient reporting tightness/discomfort at start of session as well as increased knee swelling  Objective: See treatment diary below      Assessment: Tolerated treatment fair  Patient demonstrated fatigue post treatment, exhibited good technique with therapeutic exercises and would benefit from continued PT Increased discomfort noted during session, especially with prone/sidelying positions  Therapist educated patient to inform therapist of when activity is painful so program can be re-adjusted to avoid aggravation, patient reporting understanding  Plan: Continue per plan of care  Progress treatment as tolerated         Precautions: see protocol, HTN, High cholesterol     * Indicates part of HEP     Daily Treatment Diary     Manuals     PROM Left knee 15' EOT  15' EOT 15' EOT 15' EOT/supine 15' EOT/supine       Patellar mobilizations            Prone knee extension w distraction      Done  20' total Done 20' total Done 15' total seated    Prone passive flexion stretching      Done Done  Done seated    IASTM L distal HS/proximal gastroc            Side-lying hip flexor stretch            STM/TrP soleus done Done and quad Done and quad Done & quad Done & quad Done sidelying  Done sidelying Done sidelying/seated    Neuro Re-ed            Quad sets            SAQ            LAQ 2x10  2x10 2x10 2x10 2x10        Heel slides w strap            Seated heel slides w overpressure            Hamstring/gastroc stretch w strap 4'' 10x10'' 4'' 10x10'' 4'' 10x10'' 4'' 20''x5 4'' 10x10'' supine 5x10" Supine 5x20'' Supine 5x20''    Prone quad set    nv 2x10  2x10x5" 2x10x5'' 2x10x5''    Therapeutic Exercise            [place] 10' [place] 10' Rocks 10' 5' 5' 5' lv 1 5' lvl 1 5' lvl 1                SLR flexion w/ quad set            SLR hip abduction             Bridging  2x10 2x10 3x10 3x10  3x10 3x10 3x10 3x10    Clamshells GTB 3x10 GTB 3x10 GTB 3x12 GTB 3x12 GTB 3x15 GTB 3x15 GTB 3x15 GTB 3x15    Heel raises            Seated knee extension hangs    HEP                    TKE TB   OTB 2x10x5''         TKE ball    10x5" 20x5'' 20x5" 20x5'' 20x5''    Soleus stretch standing :10x5 :10x5 :10x5         Standing gastroc stretch    :10x10 :10x10 10x5" 10x5'' 10x5''    Therapeutic Activity            Leg press     nv 65# 3x10 65# 3x12 65# 3x15 65# 3x15    Step ups            Lateral step ups            STS                        Gait training w RW            Gait training w SPC            Modalities            NMES L quad isometrics 15'' on 30'' off 75pps 400 us 10' lv 40   nv        CP PRN

## 2021-07-19 ENCOUNTER — OFFICE VISIT (OUTPATIENT)
Dept: PHYSICAL THERAPY | Facility: REHABILITATION | Age: 51
End: 2021-07-19
Payer: OTHER MISCELLANEOUS

## 2021-07-19 DIAGNOSIS — Z96.652 S/P REVISION OF TOTAL KNEE, LEFT: Primary | ICD-10-CM

## 2021-07-19 DIAGNOSIS — M25.562 CHRONIC KNEE PAIN AFTER TOTAL REPLACEMENT OF LEFT KNEE JOINT: ICD-10-CM

## 2021-07-19 DIAGNOSIS — Z96.652 CHRONIC KNEE PAIN AFTER TOTAL REPLACEMENT OF LEFT KNEE JOINT: ICD-10-CM

## 2021-07-19 DIAGNOSIS — G89.29 CHRONIC KNEE PAIN AFTER TOTAL REPLACEMENT OF LEFT KNEE JOINT: ICD-10-CM

## 2021-07-19 PROCEDURE — 97110 THERAPEUTIC EXERCISES: CPT | Performed by: PHYSICAL THERAPIST

## 2021-07-19 PROCEDURE — 97140 MANUAL THERAPY 1/> REGIONS: CPT | Performed by: PHYSICAL THERAPIST

## 2021-07-19 PROCEDURE — 97530 THERAPEUTIC ACTIVITIES: CPT | Performed by: PHYSICAL THERAPIST

## 2021-07-19 NOTE — PROGRESS NOTES
Daily Note     Today's date: 2021  Patient name: Sharyle Candy  : 1970  MRN: [id number]  Referring provider: Karen Gates MD  Dx:   Encounter Diagnosis     ICD-10-CM    1  S/P revision of total knee, left  Z96 652    2  Chronic knee pain after total replacement of left knee joint  M25 562     G89 29     Z96 652        Start Time: 930  Stop Time: 1015  Total time in clinic (min): 45 minutes    Subjective: Patient reports left knee soreness and stiffness at start of session  He reports continued left knee buckling four times this morning when walking to his car  He also reports continued left knee swelling with any activity  Objective: See treatment diary below      Assessment: Tolerated treatment fair  Regressed repetitions of all TE, no onset of swelling until patient completed gastroc stretch with some episodes of left knee buckling  Held additional manual therapy for knee joint stretching due to increase in swelling  Immediate onset of 3+ effusion continues to limit patient in terms of range of motion and quad activation likely contributing to left knee buckling  Patient demonstrated fatigue post treatment, exhibited good technique with therapeutic exercises and would benefit from continued PT  Plan: Continue per plan of care        Precautions: see protocol, HTN, High cholesterol     * Indicates part of HEP     Daily Treatment Diary     Manuals    PROM Left knee 15' EOT  15' EOT 15' EOT 15' EOT/supine 15' EOT/supine       Patellar mobilizations            Prone knee extension w distraction      Done  20' total Done 20' total Done 15' total seated np   Prone passive flexion stretching      Done Done  Done seated np   IASTM L distal HS/proximal gastroc            Side-lying hip flexor stretch            STM/TrP soleus done Done and quad Done and quad Done & quad Done & quad Done sidelying  Done sidelying Done sidelying/seated Done sidelying 10' Neuro Re-ed            [employer]            SAQ            LAQ 2x10  2x10 2x10 2x10 2x10        Heel slides w strap            Seated heel slides w overpressure            Hamstring/gastroc stretch w strap 4'' 10x10'' 4'' 10x10'' 4'' 10x10'' 4'' 20''x5 4'' 10x10'' supine 5x10" Supine 5x20'' Supine 5x20'' Supine strap 5x20"   Prone quad set    nv 2x10  2x10x5" 2x10x5'' 2x10x5'' nv   Therapeutic Exercise            [place] 10' [place] 10' Rocks 10' 5' 5' 5' lv 1 5' lvl 1 5' lvl 1 5' lv 1               SLR flexion w/ quad set            SLR hip abduction             Bridging  2x10 2x10 3x10 3x10  3x10 3x10 3x10 3x10 2x10 hold nv   Clamshells GTB 3x10 GTB 3x10 GTB 3x12 GTB 3x12 GTB 3x15 GTB 3x15 GTB 3x15 GTB 3x15 BTB 3x10   Heel raises            Seated knee extension hangs    HEP                    TKE TB   OTB 2x10x5''         TKE ball    10x5" 20x5'' 20x5" 20x5'' 20x5'' np   Soleus stretch standing :10x5 :10x5 :10x5         Standing gastroc stretch    :10x10 :10x10 10x5" 10x5'' 10x5'' 10x5" quad set   Therapeutic Activity            Leg press     nv 65# 3x10 65# 3x12 65# 3x15 65# 3x15 65# 3x10   Step ups         glute/quad isos 4" x10   Lateral step ups            STS                        Gait training w RW            Gait training w SPC            Modalities            NMES L quad isometrics 15'' on 30'' off 75pps 400 us 10' lv 40   nv        CP PRN

## 2021-07-21 ENCOUNTER — OFFICE VISIT (OUTPATIENT)
Dept: PHYSICAL THERAPY | Facility: REHABILITATION | Age: 51
End: 2021-07-21
Payer: OTHER MISCELLANEOUS

## 2021-07-21 DIAGNOSIS — Z96.652 CHRONIC KNEE PAIN AFTER TOTAL REPLACEMENT OF LEFT KNEE JOINT: ICD-10-CM

## 2021-07-21 DIAGNOSIS — M25.562 CHRONIC KNEE PAIN AFTER TOTAL REPLACEMENT OF LEFT KNEE JOINT: ICD-10-CM

## 2021-07-21 DIAGNOSIS — Z96.652 S/P REVISION OF TOTAL KNEE, LEFT: Primary | ICD-10-CM

## 2021-07-21 DIAGNOSIS — G89.29 CHRONIC KNEE PAIN AFTER TOTAL REPLACEMENT OF LEFT KNEE JOINT: ICD-10-CM

## 2021-07-21 PROCEDURE — 97140 MANUAL THERAPY 1/> REGIONS: CPT

## 2021-07-21 PROCEDURE — 97110 THERAPEUTIC EXERCISES: CPT

## 2021-07-21 PROCEDURE — 97530 THERAPEUTIC ACTIVITIES: CPT

## 2021-07-21 NOTE — PROGRESS NOTES
Daily Note     Today's date: 2021  Patient name: Sharyle Candy  : 1970  MRN: [id number]  Referring provider: Karen Gates MD  Dx:   Encounter Diagnosis     ICD-10-CM    1  S/P revision of total knee, left  Z96 652    2  Chronic knee pain after total replacement of left knee joint  M25 562     G89 29     Z96 652        Start Time: 49  Stop Time: 933  Total time in clinic (min): 46 minutes    Subjective: Patient reporting knee pain at start of session, lateral aspect of knee more than anything reporting a "huritng numbness " Patient reports swelling as "the same" stating no changes since previous session, reporting increased swelling with any activity performed throughout the day  Patient also reporting increased knee buckling as well  He is to have follow up appointment with pain management today  Objective: See treatment diary below      Assessment: Tolerated treatment fair  Patient demonstrated fatigue post treatment, exhibited good technique with therapeutic exercises and would benefit from continued PT Patient continues to demonstrate increased knee swelling throughout session, held certain TE secondary to discomfort/swelling  Some buckling noted throughout session today as well  Plan: Continue per plan of care  Progress treatment as tolerated         Precautions: see protocol, HTN, High cholesterol     * Indicates part of HEP     Daily Treatment Diary     Manuals    PROM Left knee   15' EOT 15' EOT/supine 15' EOT/supine       Patellar mobilizations            Prone knee extension w distraction      Done  20' total Done 20' total Done 15' total seated np   Prone passive flexion stretching      Done Done  Done seated np   IASTM L distal HS/proximal gastroc            Side-lying hip flexor stretch            STM/TrP soleus Done sidelying 10'  Done and quad Done & quad Done & quad Done sidelying  Done sidelying Done sidelying/seated Done sidelying 10'   Neuro Re-ed            [employer]            SAQ            LAQ   2x10 2x10 2x10        Heel slides w strap            Seated heel slides w overpressure            Hamstring/gastroc stretch w strap Supine strap 5x20''  4'' 10x10'' 4'' 20''x5 4'' 10x10'' supine 5x10" Supine 5x20'' Supine 5x20'' Supine strap 5x20"   Prone quad set 2x10x5''   nv 2x10  2x10x5" 2x10x5'' 2x10x5'' nv   Therapeutic Exercise            Bike 5' lvl 1  [place]ocks 10' 5' 5' 5' lv 1 5' lvl 1 5' lvl 1 5' lv 1               SLR flexion w/ quad set            SLR hip abduction             Bridging  hold  3x10 3x10  3x10 3x10 3x10 3x10 2x10 hold nv   Clamshells BTB 3x10  GTB 3x12 GTB 3x12 GTB 3x15 GTB 3x15 GTB 3x15 GTB 3x15 BTB 3x10   Heel raises            Seated knee extension hangs    HEP                    TKE TB   OTB 2x10x5''         TKE ball    10x5" 20x5'' 20x5" 20x5'' 20x5'' np   Soleus stretch standing   :10x5         Standing gastroc stretch 10x5'' quad set   :10x10 :10x10 10x5" 10x5'' 10x5'' 10x5" quad set   Therapeutic Activity            Leg press  65# 3x10   nv 65# 3x10 65# 3x12 65# 3x15 65# 3x15 65# 3x10   Step ups Glut/quad isos 4'' x10         glute/quad isos 4" x10   Lateral step ups            STS                        Gait training w RW            Gait training w SPC            Modalities            NMES L quad isometrics    nv        CP PRN

## 2021-07-26 ENCOUNTER — OFFICE VISIT (OUTPATIENT)
Dept: PHYSICAL THERAPY | Facility: REHABILITATION | Age: 51
End: 2021-07-26
Payer: OTHER MISCELLANEOUS

## 2021-07-26 ENCOUNTER — FORM ENCOUNTER (OUTPATIENT)
Age: 51
End: 2021-07-26

## 2021-07-26 DIAGNOSIS — M25.562 CHRONIC KNEE PAIN AFTER TOTAL REPLACEMENT OF LEFT KNEE JOINT: ICD-10-CM

## 2021-07-26 DIAGNOSIS — Z96.652 S/P REVISION OF TOTAL KNEE, LEFT: Primary | ICD-10-CM

## 2021-07-26 DIAGNOSIS — G89.29 CHRONIC KNEE PAIN AFTER TOTAL REPLACEMENT OF LEFT KNEE JOINT: ICD-10-CM

## 2021-07-26 DIAGNOSIS — Z96.652 CHRONIC KNEE PAIN AFTER TOTAL REPLACEMENT OF LEFT KNEE JOINT: ICD-10-CM

## 2021-07-26 PROCEDURE — 97112 NEUROMUSCULAR REEDUCATION: CPT | Performed by: PHYSICAL THERAPIST

## 2021-07-26 PROCEDURE — 97140 MANUAL THERAPY 1/> REGIONS: CPT | Performed by: PHYSICAL THERAPIST

## 2021-07-26 PROCEDURE — 97530 THERAPEUTIC ACTIVITIES: CPT | Performed by: PHYSICAL THERAPIST

## 2021-07-26 NOTE — PROGRESS NOTES
Daily Note     Today's date: 2021  Patient name: Ceci Chun  : 1970  MRN: [id number]  Referring provider: Barbara Tejeda MD  Dx:   Encounter Diagnosis     ICD-10-CM    1  S/P revision of total knee, left  Z96 652    2  Chronic knee pain after total replacement of left knee joint  M25 562     G89 29     Z96 652        Start Time: 0845  Stop Time: 930  Total time in clinic (min): 45 minutes    Subjective: Patient had a virtual visit with pain management last week, Dr Shruthi Calloway, and is scheduled to see her and Dr Melanie Guallpa for an in person visit on 2021  He reports continued swelling over the weekend noting the knee can swell when he walks from one room to another  Objective: See treatment diary below      Assessment: Tolerated treatment fair  Trialed SLR with patient moderate quad lag noted and patient limited by pain  Patient demonstrates onset of swelling following passive stretching and quad sets this visit  Continue to be limited in progressions of all TE due to increased swelling and poor quadriceps activation  Educated patient on activity modification and importance of icing/elevation with increased knee joint swelling  Patient demonstrated fatigue post treatment and would benefit from continued PT  Plan: Continue per plan of care        Precautions: see protocol, HTN, High cholesterol     * Indicates part of HEP     Daily Treatment Diary     Manuals    PROM Left knee     15' EOT/supine       Patellar mobilizations            Prone knee extension w distraction  Done    Done  20' total Done 20' total Done 15' total seated np   Prone passive flexion stretching  Done    Done Done  Done seated np   IASTM L distal HS/proximal gastroc            Side-lying hip flexor stretch            STM/TrP soleus Done sidelying 10' Done   Done & quad Done sidelying  Done sidelying Done sidelying/seated Done sidelying 10'   Neuro Re-ed            [employer] SAQ            LAQ     2x10        Heel slides w strap  10x5"            Seated heel slides w overpressure            Hamstring/gastroc stretch w strap Supine strap 5x20''    4'' 10x10'' supine 5x10" Supine 5x20'' Supine 5x20'' Supine strap 5x20"   Prone quad set 2x10x5'' 2x10x5"   2x10  2x10x5" 2x10x5'' 2x10x5'' nv   Therapeutic Exercise            Bike 5' lvl 1 5' lv 1   5' 5' lv 1 5' lvl 1 5' lvl 1 5' lv 1               SLR flexion w/ quad set  5 p! + QL          SLR hip abduction             Bridging  hold    3x10 3x10 3x10 3x10 2x10 hold nv   Clamshells BTB 3x10 nv   GTB 3x15 GTB 3x15 GTB 3x15 GTB 3x15 BTB 3x10   Heel raises            Seated knee extension hangs                        TKE TB            TKE ball     20x5'' 20x5" 20x5'' 20x5'' np   Soleus stretch standing            Standing gastroc stretch 10x5'' quad set    :10x10 10x5" 10x5'' 10x5'' 10x5" quad set   Therapeutic Activity            Leg press  65# 3x10 65# 3x10   65# 3x10 65# 3x12 65# 3x15 65# 3x15 65# 3x10   Step ups Glut/quad isos 4'' x10  Glute/quad isos 4" 10x5"       glute/quad isos 4" x10   Lateral step ups            STS                        Gait training w RW            Gait training w SPC            Modalities            NMES L quad isometrics            CP PRN

## 2021-07-26 NOTE — PRE-ANESTHESIA EVALUATION ADULT - NSANTHINDVINFOSD_GEN_ALL_CORE
Patient scheduled for us retroperitoneal  at [facility] on Aug 23 at 1 pm arrive at 12:45. Patient advised of instructions.   Order given to patient patient

## 2021-07-28 ENCOUNTER — OFFICE VISIT (OUTPATIENT)
Dept: PHYSICAL THERAPY | Facility: REHABILITATION | Age: 51
End: 2021-07-28
Payer: OTHER MISCELLANEOUS

## 2021-07-28 DIAGNOSIS — Z96.652 CHRONIC KNEE PAIN AFTER TOTAL REPLACEMENT OF LEFT KNEE JOINT: ICD-10-CM

## 2021-07-28 DIAGNOSIS — M25.562 CHRONIC KNEE PAIN AFTER TOTAL REPLACEMENT OF LEFT KNEE JOINT: ICD-10-CM

## 2021-07-28 DIAGNOSIS — Z96.652 S/P REVISION OF TOTAL KNEE, LEFT: Primary | ICD-10-CM

## 2021-07-28 DIAGNOSIS — G89.29 CHRONIC KNEE PAIN AFTER TOTAL REPLACEMENT OF LEFT KNEE JOINT: ICD-10-CM

## 2021-07-28 PROCEDURE — 97110 THERAPEUTIC EXERCISES: CPT | Performed by: PHYSICAL THERAPIST

## 2021-07-28 PROCEDURE — 97112 NEUROMUSCULAR REEDUCATION: CPT | Performed by: PHYSICAL THERAPIST

## 2021-07-28 NOTE — PROGRESS NOTES
Daily Note     Today's date: 2021  Patient name: Reymundo Vazquez  : 1970  MRN: [id number]  Referring provider: Lona Nunez MD  Dx:   Encounter Diagnosis     ICD-10-CM    1  S/P revision of total knee, left  Z96 652    2  Chronic knee pain after total replacement of left knee joint  M25 562     G89 29     Z96 652        Start Time: 08  Stop Time: 915  Total time in clinic (min): 30 minutes    Subjective: Patient presents to PT with knee joint swelling and reports it continues to swell with any activity  It will decrease with rest        Objective: See treatment diary below      Assessment: Tolerated treatment fair  Patient demonstrated fatigue post treatment and would benefit from continued PT  Patient achieved about -8* knee extension actively and -2/3* passively  Patient's knee began to show increased swelling following heel slides and quad sets therefore held remainder of knee strengthening  Patient has made MD aware of knee joint swelling but therapist will also contact MD to make them aware as therapy progressions have been limited due to swelling  Plan: Continue per plan of care        Precautions: see protocol, HTN, High cholesterol     * Indicates part of HEP     Daily Treatment Diary     Manuals    PROM Left knee     15' EOT/supine       Patellar mobilizations            Prone knee extension w distraction  Done    Done  20' total Done 20' total Done 15' total seated np   Prone passive flexion stretching  Done    Done Done  Done seated np   IASTM L distal HS/proximal gastroc            Side-lying hip flexor stretch            STM/TrP soleus Done sidelying 10' Done   Done & quad Done sidelying  Done sidelying Done sidelying/seated Done sidelying 10'   Neuro Re-ed            Quad sets            SAQ            LAQ     2x10        Heel slides w strap  10x5"   20x5"          Seated heel slides w overpressure            Hamstring/gastroc stretch w strap Supine strap 5x20''    4'' 10x10'' supine 5x10" Supine 5x20'' Supine 5x20'' Supine strap 5x20"   Prone quad set 2x10x5'' 2x10x5" 2x10x5"  2x10  2x10x5" 2x10x5'' 2x10x5'' nv   Therapeutic Exercise            Bike 5' lvl 1 5' lv 1 5' lv 1  5' 5' lv 1 5' lvl 1 5' lvl 1 5' lv 1               SLR flexion w/ quad set  5 p! + QL          SLR hip abduction             Bridging  hold    3x10 3x10 3x10 3x10 2x10 hold nv   Clamshells BTB 3x10 nv BTB 3x12  GTB 3x15 GTB 3x15 GTB 3x15 GTB 3x15 BTB 3x10   Heel raises   3x10         Seated knee extension hangs                        TKE TB            TKE ball     20x5'' 20x5" 20x5'' 20x5'' np   Soleus stretch standing            Standing gastroc stretch 10x5'' quad set    :10x10 10x5" 10x5'' 10x5'' 10x5" quad set   Therapeutic Activity            Leg press  65# 3x10 65# 3x10   65# 3x10 65# 3x12 65# 3x15 65# 3x15 65# 3x10   Step ups Glut/quad isos 4'' x10  Glute/quad isos 4" 10x5"       glute/quad isos 4" x10   Lateral step ups            STS                        Gait training w RW            Gait training w SPC            Modalities            NMES L quad isometrics            CP PRN

## 2021-08-02 ENCOUNTER — OFFICE VISIT (OUTPATIENT)
Dept: PHYSICAL THERAPY | Facility: REHABILITATION | Age: 51
End: 2021-08-02
Payer: OTHER MISCELLANEOUS

## 2021-08-02 DIAGNOSIS — M25.562 CHRONIC KNEE PAIN AFTER TOTAL REPLACEMENT OF LEFT KNEE JOINT: ICD-10-CM

## 2021-08-02 DIAGNOSIS — Z96.652 CHRONIC KNEE PAIN AFTER TOTAL REPLACEMENT OF LEFT KNEE JOINT: ICD-10-CM

## 2021-08-02 DIAGNOSIS — Z96.652 S/P REVISION OF TOTAL KNEE, LEFT: Primary | ICD-10-CM

## 2021-08-02 DIAGNOSIS — G89.29 CHRONIC KNEE PAIN AFTER TOTAL REPLACEMENT OF LEFT KNEE JOINT: ICD-10-CM

## 2021-08-02 PROCEDURE — 97112 NEUROMUSCULAR REEDUCATION: CPT

## 2021-08-02 PROCEDURE — 97110 THERAPEUTIC EXERCISES: CPT

## 2021-08-02 NOTE — PROGRESS NOTES
Daily Note     Today's date: 2021  Patient name: Susanna Koo  : 1970  MRN: [id number]  Referring provider: Rajan Callaway MD  Dx:   Encounter Diagnosis     ICD-10-CM    1  S/P revision of total knee, left  Z96 652    2  Chronic knee pain after total replacement of left knee joint  M25 562     G89 29     Z96 652        Start Time: 08  Stop Time: 915  Total time in clinic (min): 30 minutes    Subjective: Patient reports "im hurting" at start of session, knee swelling still present  He reports no changes after previous session, increased swelling still noted  Objective: See treatment diary below      Assessment: Tolerated treatment fair  Patient demonstrated fatigue post treatment, exhibited good technique with therapeutic exercises and would benefit from continued PT Patient continues to demonstrate increased knee swelling after first exercise, heel slides  Increased knee discomfort noted with most TE, did not complete additional reps or TE secondary to discomfort/swelling level  Patient reports he is to follow up with MD at the end of this month  Plan: Continue per plan of care  Progress treatment as tolerated         Precautions: see protocol, HTN, High cholesterol     * Indicates part of HEP     Daily Treatment Diary     Manuals    PROM Left knee            Patellar mobilizations            Prone knee extension w distraction  Done     Done 20' total Done 15' total seated np   Prone passive flexion stretching  Done     Done  Done seated np   IASTM L distal HS/proximal gastroc            Side-lying hip flexor stretch            STM/TrP soleus Done sidelying 10' Done     Done sidelying Done sidelying/seated Done sidelying 10'   Neuro Re-ed            Quad sets            SAQ            LAQ            Heel slides w strap  10x5"   20x5"  20x5''        Seated heel slides w overpressure            Hamstring/gastroc stretch w strap Supine strap 5x20'' Supine 5x20'' Supine 5x20'' Supine strap 5x20"   Prone quad set 2x10x5'' 2x10x5" 2x10x5" 2x10x5''   2x10x5'' 2x10x5'' nv   Therapeutic Exercise            Bike 5' lvl 1 5' lv 1 5' lv 1 5' lvl 1   5' lvl 1 5' lvl 1 5' lv 1               SLR flexion w/ quad set  5 p! + QL          SLR hip abduction             Bridging  hold      3x10 3x10 2x10 hold nv   Clamshells BTB 3x10 nv BTB 3x12 BTB 3x12   GTB 3x15 GTB 3x15 BTB 3x10   Heel raises   3x10         Seated knee extension hangs                        TKE TB            TKE ball       20x5'' 20x5'' np   Soleus stretch standing            Standing gastroc stretch 10x5'' quad set      10x5'' 10x5'' 10x5" quad set   Therapeutic Activity            Leg press  65# 3x10 65# 3x10     65# 3x15 65# 3x15 65# 3x10   Step ups Glut/quad isos 4'' x10  Glute/quad isos 4" 10x5"       glute/quad isos 4" x10   Lateral step ups            STS                        Gait training w RW            Gait training w SPC            Modalities            NMES L quad isometrics            CP PRN

## 2021-08-04 ENCOUNTER — APPOINTMENT (OUTPATIENT)
Dept: PHYSICAL THERAPY | Facility: REHABILITATION | Age: 51
End: 2021-08-04
Payer: OTHER MISCELLANEOUS

## 2021-08-11 ENCOUNTER — APPOINTMENT (OUTPATIENT)
Dept: ORTHOPEDIC SURGERY | Facility: CLINIC | Age: 51
End: 2021-08-11
Payer: OTHER MISCELLANEOUS

## 2021-08-11 DIAGNOSIS — Z96.652 PRESENCE OF LEFT ARTIFICIAL KNEE JOINT: ICD-10-CM

## 2021-08-11 PROCEDURE — 99214 OFFICE O/P EST MOD 30 MIN: CPT

## 2021-08-11 PROCEDURE — 99072 ADDL SUPL MATRL&STAF TM PHE: CPT

## 2021-08-13 PROBLEM — Z96.652 STATUS POST REVISION OF TOTAL REPLACEMENT OF LEFT KNEE: Status: ACTIVE | Noted: 2021-05-11

## 2021-08-13 NOTE — HISTORY OF PRESENT ILLNESS
[de-identified] : 51 year old male presents to the office today for a follow up on his revision left total knee replacement. Patient is here after reporting that every time he goes to physical therapy his knee swells significantly. Patient reports doing PT 2x per week usually. He reports using his Vanessa Splint daily and states he sleeps with it on every night. Patient does continue to complain of pain and currently takes Oxycodone 10 mg twice daily given by his pain management doctor. Patient is here today to discuss his next steps.

## 2021-09-17 NOTE — PROGRESS NOTES
PT Discharge    Today's date: 2021  Patient name: Rhona Padron  : 1970  MRN: [id number]  Referring provider: Tavo Higginbotham MD  Dx:   Encounter Diagnosis     ICD-10-CM    1  S/P revision of total knee, left  Z96 652    2  Chronic knee pain after total replacement of left knee joint  M25 562     G89 29     Z96 652        Start Time: 0845  Stop Time: 915  Total time in clinic (min): 30 minutes    Assessment  Assessment details: Rhona Padron seen for OP PT for left TKA revision for 22 visits with most recent visit on 2021  Vaibhav Noe returned to MD for further assessment as he had been limited with consistent left knee joint effusion and pain  As per Dr Garfield Shepard orders, PT will be placed on hold  At this time, Rhona Padron will be discharged from physical therapy services  Patient given HEP throughout OP PT and is encouraged to contact PT with any questions or concerns in the future              Subjective    Objective

## 2022-03-15 ENCOUNTER — HOSPITAL ENCOUNTER (EMERGENCY)
Facility: HOSPITAL | Age: 52
Discharge: HOME | End: 2022-03-15
Attending: EMERGENCY MEDICINE | Admitting: EMERGENCY MEDICINE
Payer: COMMERCIAL

## 2022-03-15 VITALS
HEART RATE: 65 BPM | DIASTOLIC BLOOD PRESSURE: 100 MMHG | RESPIRATION RATE: 20 BRPM | SYSTOLIC BLOOD PRESSURE: 146 MMHG | OXYGEN SATURATION: 100 % | TEMPERATURE: 98.3 F

## 2022-03-15 DIAGNOSIS — M54.50 LOW BACK PAIN: Primary | ICD-10-CM

## 2022-03-15 PROCEDURE — 99283 EMERGENCY DEPT VISIT LOW MDM: CPT

## 2022-03-15 PROCEDURE — 99284 EMERGENCY DEPT VISIT MOD MDM: CPT | Performed by: STUDENT IN AN ORGANIZED HEALTH CARE EDUCATION/TRAINING PROGRAM

## 2022-03-15 RX ORDER — IBUPROFEN 600 MG/1
600 TABLET ORAL ONCE
Status: COMPLETED | OUTPATIENT
Start: 2022-03-15 | End: 2022-03-15

## 2022-03-15 RX ORDER — LIDOCAINE 50 MG/G
1 PATCH TOPICAL ONCE
Status: DISCONTINUED | OUTPATIENT
Start: 2022-03-15 | End: 2022-03-15 | Stop reason: HOSPADM

## 2022-03-15 RX ORDER — METHOCARBAMOL 500 MG/1
500 TABLET, FILM COATED ORAL 2 TIMES DAILY PRN
Qty: 10 TABLET | Refills: 0 | Status: SHIPPED | OUTPATIENT
Start: 2022-03-15 | End: 2022-06-07

## 2022-03-15 RX ORDER — METHOCARBAMOL 500 MG/1
500 TABLET, FILM COATED ORAL ONCE
Status: COMPLETED | OUTPATIENT
Start: 2022-03-15 | End: 2022-03-15

## 2022-03-15 RX ADMIN — METHOCARBAMOL 500 MG: 500 TABLET ORAL at 01:22

## 2022-03-15 RX ADMIN — IBUPROFEN 600 MG: 600 TABLET ORAL at 02:31

## 2022-03-15 RX ADMIN — LIDOCAINE 5% 1 PATCH: 700 PATCH TOPICAL at 01:24

## 2022-03-15 NOTE — DISCHARGE INSTRUCTIONS
Can take ibuprofen every 8 hours as needed for pain, please take with food  Can take previously prescribed oxycodone as needed for severe pain  Can take robaxin twice daily as needed for muscle spasms, please do not operate any heavy machinery while taking robaxin  You will receive a call from our comprehensive spine program  Please follow up with your PCP to ensure resolution of symptoms  Please return to the ED with any new or worsening symptoms
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
## 2022-03-17 ENCOUNTER — TELEPHONE (OUTPATIENT)
Dept: PHYSICAL THERAPY | Facility: OTHER | Age: 52
End: 2022-03-17
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
## 2022-06-07 ENCOUNTER — APPOINTMENT (EMERGENCY)
Dept: RADIOLOGY | Facility: HOSPITAL | Age: 52
End: 2022-06-07
Payer: MEDICARE

## 2022-06-07 ENCOUNTER — HOSPITAL ENCOUNTER (EMERGENCY)
Facility: HOSPITAL | Age: 52
Discharge: HOME | End: 2022-06-08
Attending: EMERGENCY MEDICINE | Admitting: EMERGENCY MEDICINE
Payer: MEDICARE

## 2022-06-07 VITALS
TEMPERATURE: 99.7 F | BODY MASS INDEX: 29.25 KG/M2 | RESPIRATION RATE: 16 BRPM | SYSTOLIC BLOOD PRESSURE: 164 MMHG | HEART RATE: 55 BPM | HEIGHT: 66 IN | OXYGEN SATURATION: 99 % | DIASTOLIC BLOOD PRESSURE: 95 MMHG | WEIGHT: 182 LBS

## 2022-06-07 DIAGNOSIS — R07.9 CHEST PAIN, UNSPECIFIED TYPE: Primary | ICD-10-CM

## 2022-06-07 LAB
ANION GAP SERPL CALCULATED.3IONS-SCNC: 8 MMOL/L (ref 4–13)
BASOPHILS # BLD AUTO: 0.07 THOUSANDS/ΜL (ref 0–0.1)
BASOPHILS NFR BLD AUTO: 1 % (ref 0–1)
BUN SERPL-MCNC: 11 MG/DL (ref 5–25)
CALCIUM SERPL-MCNC: 9.1 MG/DL (ref 8.4–10.2)
CARDIAC TROPONIN I PNL SERPL HS: 23 NG/L
CHLORIDE SERPL-SCNC: 106 MMOL/L (ref 96–108)
CO2 SERPL-SCNC: 25 MMOL/L (ref 21–32)
CREAT SERPL-MCNC: 1.02 MG/DL (ref 0.6–1.3)
EOSINOPHIL # BLD AUTO: 0.05 THOUSAND/ΜL (ref 0–0.61)
EOSINOPHIL NFR BLD AUTO: 1 % (ref 0–6)
ERYTHROCYTE [DISTWIDTH] IN BLOOD BY AUTOMATED COUNT: 11.9 % (ref 11.6–15.1)
GFR SERPL CREATININE-BSD FRML MDRD: 84 ML/MIN/1.73SQ M
GLUCOSE SERPL-MCNC: 109 MG/DL (ref 65–140)
HCT VFR BLD AUTO: 41.1 % (ref 36.5–49.3)
HGB BLD-MCNC: 13.9 G/DL (ref 12–17)
IMM GRANULOCYTES # BLD AUTO: 0.01 THOUSAND/UL (ref 0–0.2)
IMM GRANULOCYTES NFR BLD AUTO: 0 % (ref 0–2)
LYMPHOCYTES # BLD AUTO: 2.97 THOUSANDS/ΜL (ref 0.6–4.47)
LYMPHOCYTES NFR BLD AUTO: 47 % (ref 14–44)
MCH RBC QN AUTO: 32.5 PG (ref 26.8–34.3)
MCHC RBC AUTO-ENTMCNC: 33.8 G/DL (ref 31.4–37.4)
MCV RBC AUTO: 96 FL (ref 82–98)
MONOCYTES # BLD AUTO: 0.86 THOUSAND/ΜL (ref 0.17–1.22)
MONOCYTES NFR BLD AUTO: 13 % (ref 4–12)
NEUTROPHILS # BLD AUTO: 2.45 THOUSANDS/ΜL (ref 1.85–7.62)
NEUTS SEG NFR BLD AUTO: 38 % (ref 43–75)
NRBC BLD AUTO-RTO: 0 /100 WBCS
PLATELET # BLD AUTO: 238 THOUSANDS/UL (ref 149–390)
PMV BLD AUTO: 10.8 FL (ref 8.9–12.7)
POTASSIUM SERPL-SCNC: 3.2 MMOL/L (ref 3.5–5.3)
RBC # BLD AUTO: 4.28 MILLION/UL (ref 3.88–5.62)
SODIUM SERPL-SCNC: 139 MMOL/L (ref 135–147)
WBC # BLD AUTO: 6.41 THOUSAND/UL (ref 4.31–10.16)

## 2022-06-07 PROCEDURE — 36415 COLL VENOUS BLD VENIPUNCTURE: CPT | Performed by: EMERGENCY MEDICINE

## 2022-06-07 PROCEDURE — 80048 BASIC METABOLIC PNL TOTAL CA: CPT | Performed by: EMERGENCY MEDICINE

## 2022-06-07 PROCEDURE — 84484 ASSAY OF TROPONIN QUANT: CPT | Performed by: EMERGENCY MEDICINE

## 2022-06-07 PROCEDURE — 71045 X-RAY EXAM CHEST 1 VIEW: CPT

## 2022-06-07 PROCEDURE — 99284 EMERGENCY DEPT VISIT MOD MDM: CPT | Performed by: EMERGENCY MEDICINE

## 2022-06-07 PROCEDURE — 99285 EMERGENCY DEPT VISIT HI MDM: CPT

## 2022-06-07 PROCEDURE — 85025 COMPLETE CBC W/AUTO DIFF WBC: CPT | Performed by: EMERGENCY MEDICINE

## 2022-06-07 PROCEDURE — 93005 ELECTROCARDIOGRAM TRACING: CPT

## 2022-06-07 RX ORDER — SODIUM CHLORIDE 9 MG/ML
3 INJECTION INTRAVENOUS
Status: DISCONTINUED | OUTPATIENT
Start: 2022-06-07 | End: 2022-06-08 | Stop reason: HOSPADM

## 2022-06-07 RX ORDER — PREGABALIN 100 MG/1
100 CAPSULE ORAL DAILY
COMMUNITY

## 2022-06-07 RX ORDER — ASPIRIN 81 MG/1
324 TABLET, CHEWABLE ORAL ONCE
Status: COMPLETED | OUTPATIENT
Start: 2022-06-07 | End: 2022-06-07

## 2022-06-07 RX ADMIN — ASPIRIN 324 MG: 81 TABLET, CHEWABLE ORAL at 21:44

## 2022-06-08 LAB
2HR DELTA HS TROPONIN: -2 NG/L
CARDIAC TROPONIN I PNL SERPL HS: 21 NG/L

## 2022-06-08 NOTE — ED PROVIDER NOTES
History  Chief Complaint   Patient presents with    Chest Pain     Chest pain starting this morning which has worsened throughout the day  Does feel short of breath and like his heart is racing  Patient denies a history of tobacco smoking but does report he smokes medicinal marijuana  He also denies any family history of early MI or sudden cardiac death  History provided by:  Patient   used: No    Chest Pain  Pain location:  L chest  Pain quality: dull    Pain radiates to:  Does not radiate  Pain radiates to the back: no    Pain severity:  Moderate  Onset quality:  Gradual  Duration:  2 days  Timing:  Intermittent  Progression:  Waxing and waning  Chronicity:  Recurrent  Context: at rest    Context: not breathing and no movement    Relieved by:  Nothing  Worsened by:  Nothing tried  Ineffective treatments:  None tried  Associated symptoms: shortness of breath    Associated symptoms: no abdominal pain, no back pain, no cough, no fever, no near-syncope, no numbness, not vomiting and no weakness    Risk factors: no coronary artery disease, no diabetes mellitus and no smoking        Prior to Admission Medications   Prescriptions Last Dose Informant Patient Reported? Taking?   metoprolol tartrate (LOPRESSOR) 50 mg tablet   No Yes   Sig: Take 1 tablet (50 mg total) by mouth every 12 (twelve) hours   oxyCODONE (OxyCONTIN) 10 mg 12 hr tablet  Self Yes Yes   Sig: Take 10 mg by mouth every 12 (twelve) hours   pregabalin (LYRICA) 100 mg capsule   Yes Yes   Sig: Take 100 mg by mouth daily      Facility-Administered Medications: None       Past Medical History:   Diagnosis Date    High cholesterol     Hypertension        Past Surgical History:   Procedure Laterality Date    JOINT REPLACEMENT         History reviewed  No pertinent family history  I have reviewed and agree with the history as documented      E-Cigarette/Vaping    E-Cigarette Use Never User      E-Cigarette/Vaping Substances Social History     Tobacco Use    Smoking status: Never Smoker    Smokeless tobacco: Never Used   Vaping Use    Vaping Use: Never used   Substance Use Topics    Alcohol use: Not Currently    Drug use: Yes     Types: Marijuana       Review of Systems   Constitutional: Negative for fever  Respiratory: Positive for shortness of breath  Negative for cough  Cardiovascular: Positive for chest pain  Negative for near-syncope  Gastrointestinal: Negative for abdominal pain and vomiting  Musculoskeletal: Negative for back pain  Skin: Negative for color change  Neurological: Negative for weakness and numbness  All other systems reviewed and are negative  Physical Exam  Physical Exam  Vitals and nursing note reviewed  Constitutional:       Appearance: He is well-developed  He is not ill-appearing or toxic-appearing  HENT:      Head: Normocephalic and atraumatic  Eyes:      Conjunctiva/sclera: Conjunctivae normal    Cardiovascular:      Rate and Rhythm: Normal rate and regular rhythm  Heart sounds: Normal heart sounds  No murmur heard  Pulmonary:      Effort: Pulmonary effort is normal  No respiratory distress  Breath sounds: Normal breath sounds  Abdominal:      Palpations: Abdomen is soft  Tenderness: There is no abdominal tenderness  Musculoskeletal:      Cervical back: Neck supple  Right lower leg: No edema  Left lower leg: No edema  Skin:     General: Skin is warm and dry  Capillary Refill: Capillary refill takes less than 2 seconds  Neurological:      General: No focal deficit present  Mental Status: He is alert and oriented to person, place, and time  Cranial Nerves: No cranial nerve deficit  Motor: No weakness           Vital Signs  ED Triage Vitals [06/07/22 2114]   Temperature Pulse Respirations Blood Pressure SpO2   99 7 °F (37 6 °C) 71 18 138/91 100 %      Temp Source Heart Rate Source Patient Position - Orthostatic VS BP Location FiO2 (%)   Oral Monitor Lying Right arm --      Pain Score       7           Vitals:    06/07/22 2114 06/07/22 2130 06/07/22 2230   BP: 138/91 132/95 164/95   Pulse: 71 57 55   Patient Position - Orthostatic VS: Lying Lying Lying         Visual Acuity      ED Medications  Medications   sodium chloride (PF) 0 9 % injection 3 mL (has no administration in time range)   aspirin chewable tablet 324 mg (324 mg Oral Given 6/7/22 2144)       Diagnostic Studies  Results Reviewed     Procedure Component Value Units Date/Time    HS Troponin I 2hr [[id number]]  (Normal) Collected: 06/07/22 2344    Lab Status: Final result Specimen: Blood from Arm, Right Updated: 06/08/22 0015     hs TnI 2hr 21 ng/L      Delta 2hr hsTnI -2 ng/L     HS Troponin I 4hr [[id number]]     Lab Status: No result Specimen: Blood     HS Troponin 0hr (reflex protocol) [[id number]]  (Normal) Collected: 06/07/22 2144    Lab Status: Final result Specimen: Blood from Arm, Right Updated: 06/07/22 2220     hs TnI 0hr 23 ng/L     Basic metabolic panel [[id number]]  (Abnormal) Collected: 06/07/22 2144    Lab Status: Final result Specimen: Blood from Arm, Right Updated: 06/07/22 2215     Sodium 139 mmol/L      Potassium 3 2 mmol/L      Chloride 106 mmol/L      CO2 25 mmol/L      ANION GAP 8 mmol/L      BUN 11 mg/dL      Creatinine 1 02 mg/dL      Glucose 109 mg/dL      Calcium 9 1 mg/dL      eGFR 84 ml/min/1 73sq m     Narrative:      [facility] guidelines for Chronic Kidney Disease (CKD):     Stage 1 with normal or high GFR (GFR > 90 mL/min/1 73 square meters)    Stage 2 Mild CKD (GFR = 60-89 mL/min/1 73 square meters)    Stage 3A Moderate CKD (GFR = 45-59 mL/min/1 73 square meters)    Stage 3B Moderate CKD (GFR = 30-44 mL/min/1 73 square meters)    Stage 4 Severe CKD (GFR = 15-29 mL/min/1 73 square meters)    Stage 5 End Stage CKD (GFR <15 mL/min/1 73 square meters)  Note: GFR calculation is accurate only with a steady state creatinine CBC and differential [[id number]]  (Abnormal) Collected: 06/07/22 2144    Lab Status: Final result Specimen: Blood from Arm, Right Updated: 06/07/22 2202     WBC 6 41 Thousand/uL      RBC 4 28 Million/uL      Hemoglobin 13 9 g/dL      Hematocrit 41 1 %      MCV 96 fL      MCH 32 5 pg      MCHC 33 8 g/dL      RDW 11 9 %      MPV 10 8 fL      Platelets 601 Thousands/uL      nRBC 0 /100 WBCs      Neutrophils Relative 38 %      Immat GRANS % 0 %      Lymphocytes Relative 47 %      Monocytes Relative 13 %      Eosinophils Relative 1 %      Basophils Relative 1 %      Neutrophils Absolute 2 45 Thousands/µL      Immature Grans Absolute 0 01 Thousand/uL      Lymphocytes Absolute 2 97 Thousands/µL      Monocytes Absolute 0 86 Thousand/µL      Eosinophils Absolute 0 05 Thousand/µL      Basophils Absolute 0 07 Thousands/µL                  X-ray chest 1 view portable   Final Result by Angie Reno MD (06/07 2148)      No acute cardiopulmonary disease                    Workstation performed: [id number]                    Procedures  Procedures         ED Course             HEART Risk Score    Flowsheet Row Most Recent Value   Heart Score Risk Calculator    History 1 Filed at: 06/08/2022 0029   ECG 1 Filed at: 06/08/2022 0029   Age 1 Filed at: 06/08/2022 0029   Risk Factors 1 Filed at: 06/08/2022 0029   Troponin 0 Filed at: 06/08/2022 0029   HEART Score 4 Filed at: 06/08/2022 0029                                      MDM  Number of Diagnoses or Management Options     Amount and/or Complexity of Data Reviewed  Clinical lab tests: ordered and reviewed  Tests in the radiology section of CPT®: ordered and reviewed  Review and summarize past medical records: yes    Risk of Complications, Morbidity, and/or Mortality  Presenting problems: moderate  Diagnostic procedures: low  Management options: low  General comments: Patient is a 78-year-old male with very few coronary artery disease risk factors who presents with chest pain   EKG is nondiagnostic and a delta troponin that is within normal limits  Patient's chest pain has since resolved without intervention  Patient has a heart score of 4  The plan is to discharge him home with instructions to follow-up with cardiology as an outpatient  Disposition  Final diagnoses:   Chest pain, unspecified type     Time reflects when diagnosis was documented in both MDM as applicable and the Disposition within this note     Time User Action Codes Description Comment    6/8/2022 12:27 AM Jim Garcia Add [R07 9] Chest pain, unspecified type       ED Disposition     ED Disposition   Discharge    Condition   Stable    Date/Time   Wed Jun 8, 2022 12:27 AM    Comment   Ricardo Mendenhall Tatiana discharge to home/self care  Follow-up Information     Follow up With Specialties Details Why Contact Info Additional Information    [facility] Call in 1 day  [place] [id number] [facility] [address] [facility], [address] [place] [address]          Discharge Medication List as of 6/8/2022 12:31 AM      CONTINUE these medications which have NOT CHANGED    Details   metoprolol tartrate (LOPRESSOR) 50 mg tablet Take 1 tablet (50 mg total) by mouth every 12 (twelve) hours, Starting Wed 5/5/2021, Until Tue 6/7/2022, Normal      oxyCODONE (OxyCONTIN) 10 mg 12 hr tablet Take 10 mg by mouth every 12 (twelve) hours, Historical Med      pregabalin (LYRICA) 100 mg capsule Take 100 mg by mouth daily, Historical Med             No discharge procedures on file      PDMP Review     None          ED Provider  Electronically Signed by           Mirza Rincon MD  06/08/22 0369

## 2022-06-10 LAB
ATRIAL RATE: 80 BPM
P AXIS: 67 DEGREES
PR INTERVAL: 194 MS
QRS AXIS: -10 DEGREES
QRSD INTERVAL: 106 MS
QT INTERVAL: 378 MS
QTC INTERVAL: 435 MS
T WAVE AXIS: 99 DEGREES
VENTRICULAR RATE: 80 BPM

## 2022-06-10 PROCEDURE — 93010 ELECTROCARDIOGRAM REPORT: CPT | Performed by: INTERNAL MEDICINE
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
## 2022-07-21 ENCOUNTER — HOSPITAL ENCOUNTER (EMERGENCY)
Facility: HOSPITAL | Age: 52
Discharge: HOME | End: 2022-07-21
Attending: EMERGENCY MEDICINE
Payer: MEDICARE

## 2022-07-21 ENCOUNTER — APPOINTMENT (EMERGENCY)
Dept: RADIOLOGY | Facility: HOSPITAL | Age: 52
End: 2022-07-21
Payer: MEDICARE

## 2022-07-21 VITALS
BODY MASS INDEX: 29.25 KG/M2 | WEIGHT: 182 LBS | RESPIRATION RATE: 17 BRPM | DIASTOLIC BLOOD PRESSURE: 100 MMHG | HEART RATE: 53 BPM | TEMPERATURE: 98.3 F | OXYGEN SATURATION: 98 % | HEIGHT: 66 IN | SYSTOLIC BLOOD PRESSURE: 134 MMHG

## 2022-07-21 DIAGNOSIS — R03.0 ELEVATED BLOOD PRESSURE READING: ICD-10-CM

## 2022-07-21 DIAGNOSIS — R00.2 PALPITATIONS: Primary | ICD-10-CM

## 2022-07-21 DIAGNOSIS — I47.1 PAROXYSMAL SUPRAVENTRICULAR TACHYCARDIA (HCC): ICD-10-CM

## 2022-07-21 LAB
2HR DELTA HS TROPONIN: 1 NG/L
ALBUMIN SERPL BCP-MCNC: 3.9 G/DL (ref 3.5–5)
ALP SERPL-CCNC: 50 U/L (ref 34–104)
ALT SERPL W P-5'-P-CCNC: 11 U/L (ref 7–52)
ANION GAP SERPL CALCULATED.3IONS-SCNC: 7 MMOL/L (ref 4–13)
AST SERPL W P-5'-P-CCNC: 11 U/L (ref 13–39)
BASOPHILS # BLD AUTO: 0.1 THOUSANDS/ΜL (ref 0–0.1)
BASOPHILS NFR BLD AUTO: 1 % (ref 0–1)
BILIRUB SERPL-MCNC: 0.36 MG/DL (ref 0.2–1)
BUN SERPL-MCNC: 10 MG/DL (ref 5–25)
CALCIUM SERPL-MCNC: 9.3 MG/DL (ref 8.4–10.2)
CARDIAC TROPONIN I PNL SERPL HS: 13 NG/L
CARDIAC TROPONIN I PNL SERPL HS: 14 NG/L
CHLORIDE SERPL-SCNC: 111 MMOL/L (ref 96–108)
CO2 SERPL-SCNC: 24 MMOL/L (ref 21–32)
CREAT SERPL-MCNC: 1.09 MG/DL (ref 0.6–1.3)
EOSINOPHIL # BLD AUTO: 0.23 THOUSAND/ΜL (ref 0–0.61)
EOSINOPHIL NFR BLD AUTO: 3 % (ref 0–6)
ERYTHROCYTE [DISTWIDTH] IN BLOOD BY AUTOMATED COUNT: 12.1 % (ref 11.6–15.1)
GFR SERPL CREATININE-BSD FRML MDRD: 78 ML/MIN/1.73SQ M
GLUCOSE SERPL-MCNC: 114 MG/DL (ref 65–140)
HCT VFR BLD AUTO: 42.3 % (ref 36.5–49.3)
HGB BLD-MCNC: 14.5 G/DL (ref 12–17)
IMM GRANULOCYTES # BLD AUTO: 0.02 THOUSAND/UL (ref 0–0.2)
IMM GRANULOCYTES NFR BLD AUTO: 0 % (ref 0–2)
LYMPHOCYTES # BLD AUTO: 3.17 THOUSANDS/ΜL (ref 0.6–4.47)
LYMPHOCYTES NFR BLD AUTO: 46 % (ref 14–44)
MAGNESIUM SERPL-MCNC: 2.2 MG/DL (ref 1.9–2.7)
MCH RBC QN AUTO: 32.5 PG (ref 26.8–34.3)
MCHC RBC AUTO-ENTMCNC: 34.3 G/DL (ref 31.4–37.4)
MCV RBC AUTO: 95 FL (ref 82–98)
MONOCYTES # BLD AUTO: 0.63 THOUSAND/ΜL (ref 0.17–1.22)
MONOCYTES NFR BLD AUTO: 9 % (ref 4–12)
NEUTROPHILS # BLD AUTO: 2.93 THOUSANDS/ΜL (ref 1.85–7.62)
NEUTS SEG NFR BLD AUTO: 41 % (ref 43–75)
NRBC BLD AUTO-RTO: 0 /100 WBCS
PLATELET # BLD AUTO: 318 THOUSANDS/UL (ref 149–390)
PMV BLD AUTO: 10.8 FL (ref 8.9–12.7)
POTASSIUM SERPL-SCNC: 3.2 MMOL/L (ref 3.5–5.3)
PROT SERPL-MCNC: 7 G/DL (ref 6.4–8.4)
RBC # BLD AUTO: 4.46 MILLION/UL (ref 3.88–5.62)
SODIUM SERPL-SCNC: 142 MMOL/L (ref 135–147)
TSH SERPL DL<=0.05 MIU/L-ACNC: 2.06 UIU/ML (ref 0.45–4.5)
WBC # BLD AUTO: 7.08 THOUSAND/UL (ref 4.31–10.16)

## 2022-07-21 PROCEDURE — 99285 EMERGENCY DEPT VISIT HI MDM: CPT | Performed by: PHYSICIAN ASSISTANT

## 2022-07-21 PROCEDURE — 85025 COMPLETE CBC W/AUTO DIFF WBC: CPT | Performed by: PHYSICIAN ASSISTANT

## 2022-07-21 PROCEDURE — 93005 ELECTROCARDIOGRAM TRACING: CPT

## 2022-07-21 PROCEDURE — 83735 ASSAY OF MAGNESIUM: CPT | Performed by: PHYSICIAN ASSISTANT

## 2022-07-21 PROCEDURE — 36415 COLL VENOUS BLD VENIPUNCTURE: CPT | Performed by: PHYSICIAN ASSISTANT

## 2022-07-21 PROCEDURE — 84443 ASSAY THYROID STIM HORMONE: CPT | Performed by: PHYSICIAN ASSISTANT

## 2022-07-21 PROCEDURE — 80053 COMPREHEN METABOLIC PANEL: CPT | Performed by: PHYSICIAN ASSISTANT

## 2022-07-21 PROCEDURE — 84484 ASSAY OF TROPONIN QUANT: CPT | Performed by: PHYSICIAN ASSISTANT

## 2022-07-21 PROCEDURE — 71045 X-RAY EXAM CHEST 1 VIEW: CPT

## 2022-07-21 PROCEDURE — 99285 EMERGENCY DEPT VISIT HI MDM: CPT

## 2022-07-21 PROCEDURE — 96360 HYDRATION IV INFUSION INIT: CPT

## 2022-07-21 RX ORDER — METOPROLOL TARTRATE 50 MG/1
50 TABLET, FILM COATED ORAL EVERY 12 HOURS SCHEDULED
Qty: 30 TABLET | Refills: 0 | Status: SHIPPED | OUTPATIENT
Start: 2022-07-21 | End: 2022-08-05

## 2022-07-21 RX ORDER — POTASSIUM CHLORIDE 20 MEQ/1
40 TABLET, EXTENDED RELEASE ORAL ONCE
Status: COMPLETED | OUTPATIENT
Start: 2022-07-21 | End: 2022-07-21

## 2022-07-21 RX ADMIN — SODIUM CHLORIDE 1000 ML: 0.9 INJECTION, SOLUTION INTRAVENOUS at 19:31

## 2022-07-21 RX ADMIN — POTASSIUM CHLORIDE 40 MEQ: 1500 TABLET, EXTENDED RELEASE ORAL at 20:44

## 2022-07-21 RX ADMIN — METOPROLOL TARTRATE 25 MG: 25 TABLET, FILM COATED ORAL at 22:39

## 2022-07-21 NOTE — ED NOTES
Admits to history of hypertension however states never prescribed medication for same     Claudetta Masse, RN  07/21/22 4416

## 2022-07-21 NOTE — ED PROVIDER NOTES
History  Chief Complaint   Patient presents with    Palpitations     Pt states he was been experiencing palpitations and his apple watch said his heart rate was 159  Pt states he was taking metoprolol but ran out and can't find a primary care doc to refill it  PMHx: HTN, hypercholesterolemia, SVT, was on Metoprolol, but doesn't have a PCP at present to represcribe   No pertinent PSH  Presents to ED c/o palpations/tachycardia all day, with some intermittent tinging/cramping in fingers  Pt states watch alerted him the tachycardia, highest rate 160's, no sx at present  PT had some metoprolol at home did take 25mg but that didn't stop sx, have been intermittently continuing since  No fever, no cp, no sob, no diaphoresis, no abd pain, no NVD, no LE edema, no calf pain  Pt states does drink tea at home, + caffeine, but no coffee                 Prior to Admission Medications   Prescriptions Last Dose Informant Patient Reported? Taking?   metoprolol tartrate (LOPRESSOR) 50 mg tablet   No No   Sig: Take 1 tablet (50 mg total) by mouth every 12 (twelve) hours   oxyCODONE (OxyCONTIN) 10 mg 12 hr tablet  Self Yes No   Sig: Take 10 mg by mouth every 12 (twelve) hours   pregabalin (LYRICA) 100 mg capsule   Yes No   Sig: Take 100 mg by mouth daily      Facility-Administered Medications: None       Past Medical History:   Diagnosis Date    High cholesterol     Hypertension        Past Surgical History:   Procedure Laterality Date    JOINT REPLACEMENT         No family history on file  I have reviewed and agree with the history as documented      E-Cigarette/Vaping    E-Cigarette Use Never User      E-Cigarette/Vaping Substances     Social History     Tobacco Use    Smoking status: Never Smoker    Smokeless tobacco: Never Used   Vaping Use    Vaping Use: Never used   Substance Use Topics    Alcohol use: Not Currently    Drug use: Yes     Types: Marijuana       Review of Systems   Constitutional: Positive for chills  Negative for fever  HENT: Negative for hearing loss and sore throat  Respiratory: Negative for cough and shortness of breath  Cardiovascular: Positive for palpitations  Negative for chest pain and leg swelling  Gastrointestinal: Negative for abdominal pain, diarrhea, nausea and vomiting  Genitourinary: Negative for dysuria and frequency  Musculoskeletal: Positive for arthralgias  Negative for myalgias  Skin: Negative for pallor, rash and wound  Neurological: Positive for weakness and numbness  Negative for dizziness, light-headedness and headaches  Psychiatric/Behavioral: Negative for behavioral problems  All other systems reviewed and are negative  Physical Exam  Physical Exam  Vitals and nursing note reviewed  Constitutional:       General: He is not in acute distress  Appearance: Normal appearance  He is well-developed  HENT:      Head: Normocephalic and atraumatic  Right Ear: External ear normal       Left Ear: External ear normal       Nose: Nose normal       Mouth/Throat:      Mouth: Mucous membranes are moist       Pharynx: Oropharynx is clear  Eyes:      Conjunctiva/sclera: Conjunctivae normal    Cardiovascular:      Rate and Rhythm: Normal rate and regular rhythm  Pulmonary:      Effort: Pulmonary effort is normal  No respiratory distress  Breath sounds: Normal breath sounds  Abdominal:      General: Bowel sounds are normal       Palpations: Abdomen is soft  Tenderness: There is no abdominal tenderness  Musculoskeletal:         General: No swelling  Normal range of motion  Cervical back: Normal range of motion and neck supple  Right lower leg: No edema  Left lower leg: No edema  Lymphadenopathy:      Cervical: No cervical adenopathy  Skin:     General: Skin is warm and dry  Capillary Refill: Capillary refill takes less than 2 seconds  Findings: No rash  Neurological:      General: No focal deficit present  Mental Status: He is alert and oriented to person, place, and time  Motor: No weakness  Psychiatric:         Behavior: Behavior normal          Vital Signs  ED Triage Vitals [07/21/22 1852]   Temperature Pulse Respirations Blood Pressure SpO2   98 3 °F (36 8 °C) (!) 133 20 (!) 147/116 100 %      Temp Source Heart Rate Source Patient Position - Orthostatic VS BP Location FiO2 (%)   Oral Monitor Sitting Right arm --      Pain Score       No Pain           Vitals:    07/21/22 1852   BP: (!) 147/116   Pulse: (!) 133   Patient Position - Orthostatic VS: Sitting         Visual Acuity  Visual Acuity    Flowsheet Row Most Recent Value   L Pupil Size (mm) 4   R Pupil Size (mm) 4          ED Medications  Medications   sodium chloride 0 9 % bolus 1,000 mL (1,000 mL Intravenous New Bag 7/21/22 1931)   potassium chloride (K-DUR,KLOR-CON) CR tablet 40 mEq (has no administration in time range)       Diagnostic Studies  Results Reviewed     Procedure Component Value Units Date/Time    TSH [[id number]]  (Normal) Collected: 07/21/22 1929    Lab Status: Final result Specimen: Blood from Arm, Left Updated: 07/21/22 2006     TSH 3RD GENERATON 2 065 uIU/mL     Narrative:      Patients undergoing fluorescein dye angiography may retain small amounts of fluorescein in the body for 48-72 hours post procedure  Samples containing fluorescein can produce falsely depressed TSH values  If the patient had this procedure,a specimen should be resubmitted post fluorescein clearance        HS Troponin I 4hr [[id number]]     Lab Status: No result Specimen: Blood     HS Troponin 0hr (reflex protocol) [[id number]]  (Normal) Collected: 07/21/22 1929    Lab Status: Final result Specimen: Blood from Arm, Left Updated: 07/21/22 1958     hs TnI 0hr 13 ng/L     HS Troponin I 2hr [[id number]]     Lab Status: No result Specimen: Blood     Comprehensive metabolic panel [[id number]]  (Abnormal) Collected: 07/21/22 1929    Lab Status: Final result Specimen: Blood from Arm, Left Updated: 07/21/22 1954     Sodium 142 mmol/L      Potassium 3 2 mmol/L      Chloride 111 mmol/L      CO2 24 mmol/L      ANION GAP 7 mmol/L      BUN 10 mg/dL      Creatinine 1 09 mg/dL      Glucose 114 mg/dL      Calcium 9 3 mg/dL      AST 11 U/L      ALT 11 U/L      Alkaline Phosphatase 50 U/L      Total Protein 7 0 g/dL      Albumin 3 9 g/dL      Total Bilirubin 0 36 mg/dL      eGFR 78 ml/min/1 73sq m     Narrative:      National Kidney Disease Foundation guidelines for Chronic Kidney Disease (CKD):     Stage 1 with normal or high GFR (GFR > 90 mL/min/1 73 square meters)    Stage 2 Mild CKD (GFR = 60-89 mL/min/1 73 square meters)    Stage 3A Moderate CKD (GFR = 45-59 mL/min/1 73 square meters)    Stage 3B Moderate CKD (GFR = 30-44 mL/min/1 73 square meters)    Stage 4 Severe CKD (GFR = 15-29 mL/min/1 73 square meters)    Stage 5 End Stage CKD (GFR <15 mL/min/1 73 square meters)  Note: GFR calculation is accurate only with a steady state creatinine    Magnesium [[id number]]  (Normal) Collected: 07/21/22 1929    Lab Status: Final result Specimen: Blood from Arm, Left Updated: 07/21/22 1954     Magnesium 2 2 mg/dL     CBC and differential [[id number]]  (Abnormal) Collected: 07/21/22 1929    Lab Status: Final result Specimen: Blood from Arm, Left Updated: 07/21/22 1936     WBC 7 08 Thousand/uL      RBC 4 46 Million/uL      Hemoglobin 14 5 g/dL      Hematocrit 42 3 %      MCV 95 fL      MCH 32 5 pg      MCHC 34 3 g/dL      RDW 12 1 %      MPV 10 8 fL      Platelets 707 Thousands/uL      nRBC 0 /100 WBCs      Neutrophils Relative 41 %      Immat GRANS % 0 %      Lymphocytes Relative 46 %      Monocytes Relative 9 %      Eosinophils Relative 3 %      Basophils Relative 1 %      Neutrophils Absolute 2 93 Thousands/µL      Immature Grans Absolute 0 02 Thousand/uL      Lymphocytes Absolute 3 17 Thousands/µL      Monocytes Absolute 0 63 Thousand/µL      Eosinophils Absolute 0 23 Thousand/µL      Basophils Absolute 0 10 Thousands/µL                  XR chest 1 view portable    (Results Pending)              Procedures  ECG 12 Lead Documentation Only    Date/Time: 7/21/2022 7:25 PM  Performed by: Yanni Ascencio PA-C  Authorized by: Yanni Ascencio PA-C     Indications / Diagnosis:  Palpitations  ECG reviewed by me, the ED Provider: yes    Patient location:  ED  Previous ECG:     Comparison to cardiac monitor: Yes    Interpretation:     Interpretation: non-specific    Rate:     ECG rate:  65    ECG rate assessment: normal    Rhythm:     Rhythm: sinus rhythm    Comments:      PAC, no acute ischemic changes             ED Course                               SBIRT 22yo+    Flowsheet Row Most Recent Value   SBIRT (25 yo +)    In order to provide better care to our patients, we are screening all of our patients for alcohol and drug use  Would it be okay to ask you these screening questions? Yes Filed at: 07/21/2022 1905   Initial Alcohol Screen: US AUDIT-C     1  How often do you have a drink containing alcohol? 0 Filed at: 07/21/2022 1905   2  How many drinks containing alcohol do you have on a typical day you are drinking? 0 Filed at: 07/21/2022 1905   3a  Male UNDER 65: How often do you have five or more drinks on one occasion? 0 Filed at: 07/21/2022 1905   Audit-C Score 0 Filed at: 07/21/2022 1905   GENIE: How many times in the past year have you    Used an illegal drug or used a prescription medication for non-medical reasons? Never Filed at: 07/21/2022 1905                    MDM  Number of Diagnoses or Management Options  Diagnosis management comments: PT HR elevated to 130 during my exam, narrow complex tachy noted on strip, lasting few seconds, then relieved on it's own  NO pain with palpitations    Care turned over Catrina RIVERS pending repeat trop, re-eval, dispo       Amount and/or Complexity of Data Reviewed  Clinical lab tests: ordered  Tests in the radiology section of CPT®: ordered  Discuss the patient with other providers: yes        Disposition  Final diagnoses:   Palpitations     Time reflects when diagnosis was documented in both MDM as applicable and the Disposition within this note     Time User Action Codes Description Comment    7/21/2022  8:08 PM Cande Pro Add [R00 2] Palpitations       ED Disposition     None      Follow-up Information     Follow up With Specialties Details Why Contact Info Additional Information    [facility]   [address]  [address] [place] [address], [address] [facility] [address] [place], [place], [address], [address] [facility]  [address]  [address]  [address]  , [place], [place], [address]    Magno Cage MD Cardiology, Multidisciplinary   [address]  [place]  [phone]             Patient's Medications   Discharge Prescriptions    No medications on file       No discharge procedures on file      PDMP Review     None          ED Provider  Electronically Signed by           Aleisha London PA-C  07/21/22 2010

## 2022-07-22 LAB
ATRIAL RATE: 65 BPM
P AXIS: 59 DEGREES
PR INTERVAL: 216 MS
QRS AXIS: -15 DEGREES
QRSD INTERVAL: 92 MS
QT INTERVAL: 396 MS
QTC INTERVAL: 411 MS
T WAVE AXIS: 65 DEGREES
VENTRICULAR RATE: 65 BPM

## 2022-07-22 PROCEDURE — 93010 ELECTROCARDIOGRAM REPORT: CPT | Performed by: INTERNAL MEDICINE

## 2022-07-22 NOTE — DISCHARGE INSTRUCTIONS
Continue metoprolol as directed  Follow-up with cardiologist within the next week for continued evaluation of palpitations  Follow-up with primary care provider within the next week for further evaluation of elevated blood pressure findings  Return to the emergency department any new or worsening symptoms including recurrent or persistent palpitations, chest pain, lightheadedness or dizziness, passing out, shortness of breath or difficulty breathing

## 2022-07-22 NOTE — ED CARE HANDOFF
Emergency Department Sign Out Note        Sign out and transfer of care from [facility]  See Separate Emergency Department note  The patient, Rosalia Santoro, was evaluated by the previous provider for palpitations  Workup Completed:  Results Reviewed     Procedure Component Value Units Date/Time    HS Troponin I 2hr [[id number]]  (Normal) Collected: 07/21/22 2148    Lab Status: Final result Specimen: Blood from Arm, Left Updated: 07/21/22 2219     hs TnI 2hr 14 ng/L      Delta 2hr hsTnI 1 ng/L     TSH [[id number]]  (Normal) Collected: 07/21/22 1929    Lab Status: Final result Specimen: Blood from Arm, Left Updated: 07/21/22 2006     TSH 3RD GENERATON 2 065 uIU/mL     Narrative:      Patients undergoing fluorescein dye angiography may retain small amounts of fluorescein in the body for 48-72 hours post procedure  Samples containing fluorescein can produce falsely depressed TSH values  If the patient had this procedure,a specimen should be resubmitted post fluorescein clearance        HS Troponin 0hr (reflex protocol) [[id number]]  (Normal) Collected: 07/21/22 1929    Lab Status: Final result Specimen: Blood from Arm, Left Updated: 07/21/22 1958     hs TnI 0hr 13 ng/L     Comprehensive metabolic panel [[id number]]  (Abnormal) Collected: 07/21/22 1929    Lab Status: Final result Specimen: Blood from Arm, Left Updated: 07/21/22 1954     Sodium 142 mmol/L      Potassium 3 2 mmol/L      Chloride 111 mmol/L      CO2 24 mmol/L      ANION GAP 7 mmol/L      BUN 10 mg/dL      Creatinine 1 09 mg/dL      Glucose 114 mg/dL      Calcium 9 3 mg/dL      AST 11 U/L      ALT 11 U/L      Alkaline Phosphatase 50 U/L      Total Protein 7 0 g/dL      Albumin 3 9 g/dL      Total Bilirubin 0 36 mg/dL      eGFR 78 ml/min/1 73sq m     Narrative:      [employer] guidelines for Chronic Kidney Disease (CKD):     Stage 1 with normal or high GFR (GFR > 90 mL/min/1 73 square meters)    Stage 2 Mild CKD (GFR = 60-89 mL/min/1 73 square meters)    Stage 3A Moderate CKD (GFR = 45-59 mL/min/1 73 square meters)    Stage 3B Moderate CKD (GFR = 30-44 mL/min/1 73 square meters)    Stage 4 Severe CKD (GFR = 15-29 mL/min/1 73 square meters)    Stage 5 End Stage CKD (GFR <15 mL/min/1 73 square meters)  Note: GFR calculation is accurate only with a steady state creatinine    Magnesium [[id number]]  (Normal) Collected: 07/21/22 1929    Lab Status: Final result Specimen: Blood from Arm, Left Updated: 07/21/22 1954     Magnesium 2 2 mg/dL     CBC and differential [[id number]]  (Abnormal) Collected: 07/21/22 1929    Lab Status: Final result Specimen: Blood from Arm, Left Updated: 07/21/22 1936     WBC 7 08 Thousand/uL      RBC 4 46 Million/uL      Hemoglobin 14 5 g/dL      Hematocrit 42 3 %      MCV 95 fL      MCH 32 5 pg      MCHC 34 3 g/dL      RDW 12 1 %      MPV 10 8 fL      Platelets 206 Thousands/uL      nRBC 0 /100 WBCs      Neutrophils Relative 41 %      Immat GRANS % 0 %      Lymphocytes Relative 46 %      Monocytes Relative 9 %      Eosinophils Relative 3 %      Basophils Relative 1 %      Neutrophils Absolute 2 93 Thousands/µL      Immature Grans Absolute 0 02 Thousand/uL      Lymphocytes Absolute 3 17 Thousands/µL      Monocytes Absolute 0 63 Thousand/µL      Eosinophils Absolute 0 23 Thousand/µL      Basophils Absolute 0 10 Thousands/µL         XR chest 1 view portable   Final Result by Sharan Mercado MD (07/22 2822)      No acute cardiopulmonary disease  Workstation performed: [id number]               ED Course / Workup Pending (followup):  Repeat troponin, reevaluation and disposition  Procedures  MDM  Number of Diagnoses or Management Options  Elevated blood pressure reading  Palpitations  Diagnosis management comments: Patient is a 50-year-old male presents emergency department today with concern for palpitations x1 day    Per prior provider patient has been having intermittent episodes of palpitations for a while now but today states stable worse and would not stop  He is post be taking metoprolol 50 mg 2 times a day but states he takes it intermittently, only took 25 mg earlier today without relief  Upon arrival he was tachycardic in the 130s, upon initial evaluation is heart rate was in the 60s  During that time he had a episode of palpitations and was tachycardic at 130 again, caught on monitor and appeared to be sinus tach  His EKG showed minimal elevation in V2, chest x-ray was not concerning  For evaluation showed hypokalemia at 3 2 given potassium p o  By prior provider  Initial troponin of 13, repeat of 14  Due to patient's presenting symptoms and inability to ensure quick follow-up I offered admission for observation which he declined at this time  Patient is discharged home with follow-up per Cardiology and primary care provider as soon as able for continued evaluation and management  He was discharged home with a refill of his metoprolol prescription until able to follow-up  He was informed to return emergency department any new or worsening symptoms as discussed  He verbalized understanding agreed to plan of care, all of his questions were answered, patient stable at discharge         Amount and/or Complexity of Data Reviewed  Clinical lab tests: reviewed  Tests in the radiology section of CPT®: reviewed    Patient Progress  Patient progress: stable          Disposition  Final diagnoses:   Palpitations   Elevated blood pressure reading     Time reflects when diagnosis was documented in both MDM as applicable and the Disposition within this note     Time User Action Codes Description Comment    7/21/2022  8:08 PM Shira Lion Add [R00 2] Palpitations     7/21/2022 10:35 PM Tharon Moose Add [R03 0] Elevated blood pressure reading     7/21/2022 10:36 PM Tharon Moose Add [I47 1] Paroxysmal supraventricular tachycardia [facility])       ED Disposition     ED Disposition   Discharge    Condition   Stable    Date/Time   Thu Jul 21, 2022 10:34 PM    Comment   Live Luz Simpson discharge to home/self care  Follow-up Information     Follow up With Specialties Details Why Contact Info Additional [address] [facility] Family [facility]   [address]  [address] [phone]  [facility] [address] [facility], [address] [facility] [address], [place], [place], [address], [address] Medicine   [address] [address]  [address] , [place], [place], [address]    Viviana Jacques MD Cardiology, Multidisciplinary   [address] [facility]  [address]  [phone]       [address] [facility] Kary[facility] Reasoner Card[facility]   [place] [address]  [phone] [address] [facility],[address] [place], [address] [facility] [place] ([address] Emergency Department Emergency Medicine   [address] [address]  [facility] Emergency Department, [address], [address]        Discharge Medication List as of 7/21/2022 10:41 PM      CONTINUE these medications which have CHANGED    Details   metoprolol tartrate (LOPRESSOR) 50 mg tablet Take 1 tablet (50 mg total) by mouth every 12 (twelve) hours for 15 days, Starting Thu 7/21/2022, Until Fri 8/5/2022, Normal         CONTINUE these medications which have NOT CHANGED    Details   oxyCODONE (OxyCONTIN) 10 mg 12 hr tablet Take 10 mg by mouth every 12 (twelve) hours, Historical Med      pregabalin (LYRICA) 100 mg capsule Take 100 mg by mouth daily, Historical Med           No discharge procedures on file         ED Provider  Electronically Signed by     Ailyn Almendarez PA-C  07/22/22 4372

## 2023-12-31 ENCOUNTER — APPOINTMENT (EMERGENCY)
Dept: ULTRASOUND IMAGING | Facility: HOSPITAL | Age: 53
End: 2023-12-31
Payer: COMMERCIAL

## 2023-12-31 ENCOUNTER — HOSPITAL ENCOUNTER (EMERGENCY)
Facility: HOSPITAL | Age: 53
Discharge: HOME | End: 2023-12-31
Attending: EMERGENCY MEDICINE | Admitting: EMERGENCY MEDICINE
Payer: COMMERCIAL

## 2023-12-31 VITALS
SYSTOLIC BLOOD PRESSURE: 159 MMHG | HEART RATE: 90 BPM | BODY MASS INDEX: 29.44 KG/M2 | DIASTOLIC BLOOD PRESSURE: 114 MMHG | OXYGEN SATURATION: 98 % | WEIGHT: 183.2 LBS | RESPIRATION RATE: 18 BRPM | TEMPERATURE: 98.7 F | HEIGHT: 66 IN

## 2023-12-31 DIAGNOSIS — R17 JAUNDICE: Primary | ICD-10-CM

## 2023-12-31 DIAGNOSIS — K80.50 CHOLEDOCHOLITHIASIS: ICD-10-CM

## 2023-12-31 LAB
ALBUMIN SERPL BCP-MCNC: 3.7 G/DL (ref 3.5–5)
ALP SERPL-CCNC: 88 U/L (ref 34–104)
ALT SERPL W P-5'-P-CCNC: 127 U/L (ref 7–52)
ANION GAP SERPL CALCULATED.3IONS-SCNC: 4 MMOL/L
AST SERPL W P-5'-P-CCNC: 55 U/L (ref 13–39)
BASOPHILS # BLD AUTO: 0.06 THOUSANDS/ÂΜL (ref 0–0.1)
BASOPHILS NFR BLD AUTO: 1 % (ref 0–1)
BILIRUB SERPL-MCNC: 3.32 MG/DL (ref 0.2–1)
BUN SERPL-MCNC: 10 MG/DL (ref 5–25)
CALCIUM SERPL-MCNC: 8.5 MG/DL (ref 8.4–10.2)
CHLORIDE SERPL-SCNC: 108 MMOL/L (ref 96–108)
CK SERPL-CCNC: 249 U/L (ref 39–308)
CO2 SERPL-SCNC: 25 MMOL/L (ref 21–32)
CREAT SERPL-MCNC: 1.03 MG/DL (ref 0.6–1.3)
EOSINOPHIL # BLD AUTO: 0.1 THOUSAND/ÂΜL (ref 0–0.61)
EOSINOPHIL NFR BLD AUTO: 2 % (ref 0–6)
ERYTHROCYTE [DISTWIDTH] IN BLOOD BY AUTOMATED COUNT: 12.6 % (ref 11.6–15.1)
GFR SERPL CREATININE-BSD FRML MDRD: 83 ML/MIN/1.73SQ M
GLUCOSE SERPL-MCNC: 124 MG/DL (ref 65–140)
HCT VFR BLD AUTO: 45.9 % (ref 36.5–49.3)
HGB BLD-MCNC: 15.4 G/DL (ref 12–17)
IMM GRANULOCYTES # BLD AUTO: 0.02 THOUSAND/UL (ref 0–0.2)
IMM GRANULOCYTES NFR BLD AUTO: 0 % (ref 0–2)
LIPASE SERPL-CCNC: 21 U/L (ref 11–82)
LYMPHOCYTES # BLD AUTO: 1.35 THOUSANDS/ÂΜL (ref 0.6–4.47)
LYMPHOCYTES NFR BLD AUTO: 23 % (ref 14–44)
MCH RBC QN AUTO: 32.3 PG (ref 26.8–34.3)
MCHC RBC AUTO-ENTMCNC: 33.6 G/DL (ref 31.4–37.4)
MCV RBC AUTO: 96 FL (ref 82–98)
MONOCYTES # BLD AUTO: 0.71 THOUSAND/ÂΜL (ref 0.17–1.22)
MONOCYTES NFR BLD AUTO: 12 % (ref 4–12)
NEUTROPHILS # BLD AUTO: 3.66 THOUSANDS/ÂΜL (ref 1.85–7.62)
NEUTS SEG NFR BLD AUTO: 62 % (ref 43–75)
NRBC BLD AUTO-RTO: 0 /100 WBCS
PLATELET # BLD AUTO: 295 THOUSANDS/UL (ref 149–390)
PMV BLD AUTO: 10 FL (ref 8.9–12.7)
POTASSIUM SERPL-SCNC: 3.4 MMOL/L (ref 3.5–5.3)
PROT SERPL-MCNC: 6.9 G/DL (ref 6.4–8.4)
RBC # BLD AUTO: 4.77 MILLION/UL (ref 3.88–5.62)
SODIUM SERPL-SCNC: 137 MMOL/L (ref 135–147)
WBC # BLD AUTO: 5.9 THOUSAND/UL (ref 4.31–10.16)

## 2023-12-31 PROCEDURE — 99283 EMERGENCY DEPT VISIT LOW MDM: CPT

## 2023-12-31 PROCEDURE — 96374 THER/PROPH/DIAG INJ IV PUSH: CPT

## 2023-12-31 PROCEDURE — 36415 COLL VENOUS BLD VENIPUNCTURE: CPT | Performed by: EMERGENCY MEDICINE

## 2023-12-31 PROCEDURE — 85025 COMPLETE CBC W/AUTO DIFF WBC: CPT | Performed by: EMERGENCY MEDICINE

## 2023-12-31 PROCEDURE — 76705 ECHO EXAM OF ABDOMEN: CPT

## 2023-12-31 PROCEDURE — 99285 EMERGENCY DEPT VISIT HI MDM: CPT | Performed by: EMERGENCY MEDICINE

## 2023-12-31 PROCEDURE — 80053 COMPREHEN METABOLIC PANEL: CPT | Performed by: EMERGENCY MEDICINE

## 2023-12-31 PROCEDURE — 82550 ASSAY OF CK (CPK): CPT | Performed by: EMERGENCY MEDICINE

## 2023-12-31 PROCEDURE — 83690 ASSAY OF LIPASE: CPT | Performed by: EMERGENCY MEDICINE

## 2023-12-31 PROCEDURE — 96361 HYDRATE IV INFUSION ADD-ON: CPT

## 2023-12-31 RX ORDER — ONDANSETRON 2 MG/ML
4 INJECTION INTRAMUSCULAR; INTRAVENOUS ONCE
Status: COMPLETED | OUTPATIENT
Start: 2023-12-31 | End: 2023-12-31

## 2023-12-31 RX ADMIN — SODIUM CHLORIDE 1000 ML: 0.9 INJECTION, SOLUTION INTRAVENOUS at 12:32

## 2023-12-31 RX ADMIN — ONDANSETRON 4 MG: 2 INJECTION INTRAMUSCULAR; INTRAVENOUS at 12:31

## 2023-12-31 NOTE — DISCHARGE INSTRUCTIONS
Return to the ED if you agree to admission and further testing/treatment.  Otherwise follow-up with GI as soon as possible.

## 2023-12-31 NOTE — ED PROVIDER NOTES
History  Chief Complaint   Patient presents with    Vomiting     Reports seen recently for same, started vomiting/fever/diarrhea after eating [place] and symptoms persist, diarrhea resolved, one episode of vomiting today     The patient reports he ate at [employer] a few days ago, and shortly after that he developed vomiting.  He reports more than 5 episodes of vomiting.  It has been associated with upper abdominal pain.  The vomiting is slowing down, but he continues to have upper abdominal pain.  He notes that today he had very dark urine prompting his ED visit.  He denies fevers or chills.  He denies back pain.  He denies diarrhea.  In fact, he had to take a laxative to have a bowel movement.      Vomiting      Prior to Admission Medications   Prescriptions Last Dose Informant Patient Reported? Taking?   metoprolol tartrate (LOPRESSOR) 50 mg tablet   No No   Sig: Take 1 tablet (50 mg total) by mouth every 12 (twelve) hours for 15 days   oxyCODONE (OxyCONTIN) 10 mg 12 hr tablet  Self Yes No   Sig: Take 10 mg by mouth every 12 (twelve) hours   pregabalin (LYRICA) 100 mg capsule   Yes No   Sig: Take 100 mg by mouth daily      Facility-Administered Medications: None       Past Medical History:   Diagnosis Date    High cholesterol     Hypertension        Past Surgical History:   Procedure Laterality Date    CARDIAC SURGERY      HERNIA REPAIR      JOINT REPLACEMENT         History reviewed. No pertinent family history.  I have reviewed and agree with the history as documented.    E-Cigarette/Vaping    E-Cigarette Use Never User      E-Cigarette/Vaping Substances     Social History     Tobacco Use    Smoking status: Never    Smokeless tobacco: Never   Vaping Use    Vaping status: Never Used   Substance Use Topics    Alcohol use: Not Currently    Drug use: Yes     Types: Marijuana       Review of Systems   Gastrointestinal:  Positive for vomiting.   All other systems reviewed and are negative.      Physical  Exam  Physical Exam  Vitals and nursing note reviewed.   Constitutional:       General: He is not in acute distress.     Appearance: He is well-developed.   HENT:      Head: Normocephalic and atraumatic.   Eyes:      Conjunctiva/sclera: Conjunctivae normal.   Cardiovascular:      Rate and Rhythm: Normal rate and regular rhythm.      Heart sounds: No murmur heard.  Pulmonary:      Effort: Pulmonary effort is normal. No respiratory distress.      Breath sounds: Normal breath sounds.   Abdominal:      Palpations: Abdomen is soft.      Tenderness: There is abdominal tenderness (Mild right upper quadrant). There is no right CVA tenderness, left CVA tenderness, guarding or rebound.   Musculoskeletal:         General: No swelling.      Cervical back: Neck supple.   Skin:     General: Skin is warm and dry.      Capillary Refill: Capillary refill takes less than 2 seconds.   Neurological:      General: No focal deficit present.      Mental Status: He is alert.   Psychiatric:         Mood and Affect: Mood normal.         Vital Signs  ED Triage Vitals [12/31/23 1141]   Temperature Pulse Respirations Blood Pressure SpO2   98.7 °F (37.1 °C) 90 18 (!) 159/114 98 %      Temp Source Heart Rate Source Patient Position - Orthostatic VS BP Location FiO2 (%)   Oral Monitor Sitting Left arm --      Pain Score       8           Vitals:    12/31/23 1141   BP: (!) 159/114   Pulse: 90   Patient Position - Orthostatic VS: Sitting         Visual Acuity      ED Medications  Medications   sodium chloride 0.9 % bolus 1,000 mL (0 mL Intravenous Stopped 12/31/23 1400)   ondansetron (ZOFRAN) injection 4 mg (4 mg Intravenous Given 12/31/23 1231)       Diagnostic Studies  Results Reviewed       Procedure Component Value Units Date/Time    CK [[id number]]  (Normal) Collected: 12/31/23 1257    Lab Status: Final result Specimen: Blood from Arm, Right Updated: 12/31/23 1334     Total  U/L     Comprehensive metabolic panel [[id number]]  (Abnormal)  Collected: 12/31/23 1257    Lab Status: Final result Specimen: Blood from Arm, Right Updated: 12/31/23 1319     Sodium 137 mmol/L      Potassium 3.4 mmol/L      Chloride 108 mmol/L      CO2 25 mmol/L      ANION GAP 4 mmol/L      BUN 10 mg/dL      Creatinine 1.03 mg/dL      Glucose 124 mg/dL      Calcium 8.5 mg/dL      AST 55 U/L       U/L      Alkaline Phosphatase 88 U/L      Total Protein 6.9 g/dL      Albumin 3.7 g/dL      Total Bilirubin 3.32 mg/dL      eGFR 83 ml/min/1.73sq m     Narrative:      National Kid[employer] Foundation guidelines for Chronic Kidney Disease (CKD):     Stage 1 with normal or high GFR (GFR > 90 mL/min/1.73 square meters)    Stage 2 Mild CKD (GFR = 60-89 mL/min/1.73 square meters)    Stage 3A Moderate CKD (GFR = 45-59 mL/min/1.73 square meters)    Stage 3B Moderate CKD (GFR = 30-44 mL/min/1.73 square meters)    Stage 4 Severe CKD (GFR = 15-29 mL/min/1.73 square meters)    Stage 5 End Stage CKD (GFR <15 mL/min/1.73 square meters)  Note: GFR calculation is accurate only with a steady state creatinine    Lipase [[id number]]  (Normal) Collected: 12/31/23 1257    Lab Status: Final result Specimen: Blood from Arm, Right Updated: 12/31/23 1319     Lipase 21 u/L     CBC and differential [[id number]] Collected: 12/31/23 1232    Lab Status: Final result Specimen: Blood from Arm, Right Updated: 12/31/23 1238     WBC 5.90 Thousand/uL      RBC 4.77 Million/uL      Hemoglobin 15.4 g/dL      Hematocrit 45.9 %      MCV 96 fL      MCH 32.3 pg      MCHC 33.6 g/dL      RDW 12.6 %      MPV 10.0 fL      Platelets 295 Thousands/uL      nRBC 0 /100 WBCs      Neutrophils Relative 62 %      Immat GRANS % 0 %      Lymphocytes Relative 23 %      Monocytes Relative 12 %      Eosinophils Relative 2 %      Basophils Relative 1 %      Neutrophils Absolute 3.66 Thousands/µL      Immature Grans Absolute 0.02 Thousand/uL      Lymphocytes Absolute 1.35 Thousands/µL      Monocytes Absolute 0.71 Thousand/µL       Eosinophils Absolute 0.10 Thousand/µL      Basophils Absolute 0.06 Thousands/µL                    US right upper quadrant   Final Result by Soraida Marley MD (12/31 1547)      Borderline gallbladder wall thickening of 4 mm but otherwise normal in appearance.      Workstation performed: [id number]                    Procedures  Procedures         ED Course  ED Course as of 12/31/23 1741   Sun Dec 31, 2023   1445 TOTAL BILIRUBIN(!): 3.32  Concerning for biliary obstruction.  Will obtain ultrasound.   1623 I advised admission but patient refused.  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  I contacted GI on call, BRANDEE Arndt, to help facilitate outpatient follow-up but encouraged him to return to ED any any time if he is willing/able to stay in hospital and get further testing/treatments.                                             Medical Decision Making  Differential includes biliary colic, pancreatitis, gastritis, food poisoning, infectious gastroenteritis.  Will obtain labs.    Amount and/or Complexity of Data Reviewed  Labs: ordered. Decision-making details documented in ED Course.  Radiology: ordered.    Risk  Prescription drug management.             Disposition  Final diagnoses:   Jaundice   Choledocholithiasis     Time reflects when diagnosis was documented in both MDM as applicable and the Disposition within this note       Time User Action Codes Description Comment    12/31/2023  4:09 PM Harjinder Julien Add [R17] Jaundice     12/31/2023  4:10 PM Harjinder Julien Add [K80.50] Choledocholithiasis           ED Disposition       ED Disposition   AMA    Condition   --    Date/Time   Sun Dec 31, 2023  4:19 PM    Comment   Date: 12/31/2023  Patient: Cedrcik Simpson  Admitted: 12/31/2023 11:52 AM  Attending Provider: Harjinder Julien  "MD Cedrick Schmidtter or his authorized caregiver has made the decision for the patient to leave the emergency department against the a dvice of his attending physician. He or his authorized caregiver has been informed and understands the inherent risks, including death, liver failure.  He or his authorized caregiver has decided to accept the responsibility for this decision. Cedrick Simpson and all necessary parties have been advised that he may return for further evaluation or treatment. His condition at time of discharge was stable.  Cedrick Simpson had current vital signs as follows:  BP (!) 159/114 (BP Location: Left arm)    Pulse 90   Temp 98.7 °F (37.1 °C) (Oral)   Resp 18   Ht 5' 6\" (1.676 m)   Wt 83.1 kg (183 lb 3.2 oz)                Follow-up Information       Follow up With Specialties Details Why Contact Info    [facility], Nurse Practitioner   [address]   [address]  [place] [id number] [phone]              Discharge Medication List as of 12/31/2023  4:19 PM        CONTINUE these medications which have NOT CHANGED    Details   metoprolol tartrate (LOPRESSOR) 50 mg tablet Take 1 tablet (50 mg total) by mouth every 12 (twelve) hours for 15 days, Starting Thu 7/21/2022, Until Fri 8/5/2022, Normal      oxyCODONE (OxyCONTIN) 10 mg 12 hr tablet Take 10 mg by mouth every 12 (twelve) hours, Historical Med      pregabalin (LYRICA) 100 mg capsule Take 100 mg by mouth daily, Historical Med             No discharge procedures on file.    PDMP Review       None            ED Provider  Electronically Signed by             Harjinder Julien MD  12/31/23 5864    "

## 2024-01-04 ENCOUNTER — TELEPHONE (OUTPATIENT)
Dept: GASTROENTEROLOGY | Facility: CLINIC | Age: 54
End: 2024-01-04

## 2024-01-04 NOTE — TELEPHONE ENCOUNTER
Pt scheduled with Dr Caraballo. Sb       ----- Message from MICHAEL Tomlinson sent at 12/31/2023  4:37 PM EST -----  Regarding: appointment  Please call and schedule patient for consult with GI. Elevated LFT. Abdomen pain nausea and vomiting. Refused to be admitted due to  issue. See if can get in ASAP. Thank you

## 2024-01-11 ENCOUNTER — OFFICE VISIT (OUTPATIENT)
Dept: GASTROENTEROLOGY | Facility: CLINIC | Age: 54
End: 2024-01-11
Payer: COMMERCIAL

## 2024-01-11 VITALS
WEIGHT: 188.4 LBS | SYSTOLIC BLOOD PRESSURE: 155 MMHG | HEIGHT: 66 IN | BODY MASS INDEX: 30.28 KG/M2 | DIASTOLIC BLOOD PRESSURE: 107 MMHG | HEART RATE: 82 BPM

## 2024-01-11 DIAGNOSIS — R10.13 EPIGASTRIC PAIN: ICD-10-CM

## 2024-01-11 DIAGNOSIS — R79.89 ELEVATED LFTS: Primary | ICD-10-CM

## 2024-01-11 PROCEDURE — 99204 OFFICE O/P NEW MOD 45 MIN: CPT | Performed by: INTERNAL MEDICINE

## 2024-01-11 RX ORDER — AMLODIPINE BESYLATE 5 MG/1
5 TABLET ORAL 2 TIMES DAILY
COMMUNITY
Start: 2023-08-10

## 2024-01-11 NOTE — PROGRESS NOTES
[facility] [place] - Outpatient Consultation  Cedrick Simpson 53 y.o. male MRN: [id number]  Encounter: [id number]          ASSESSMENT AND PLAN:      1. Elevated LFTs  -     CBC and differential; Future  -     Comprehensive metabolic panel; Future  -     Chronic Hepatitis Panel; Future  -     Gamma GT; Future    2. Epigastric pain      Patient had an episode of epigastric pain with associated elevation of LFTs, ultrasound did not show any gallstones, CBD 3 mm and nondilated biliary tree.  Presentation very highly suggestive of CBD stone/colic, the pain is completely resolved, the ultrasound did not show any stone and/or dilated biliary tree.  Will repeat LFTs check for hepatitis profile as well.  If LFTs are still elevated, or recurrent pain then may need MR/MRCP.  This plan was discussed with patient if any recurrent pain he will call urgently for evaluation.   Patient aware of possible diagnosis and consequences of biliary obstruction.  ______________________________________________________________________    HPI:      Patient came in for evaluation, had epigastric pain couple weeks ago, went to the ED, elevated LFTs with AST ALT of 55 and 127 T. bili of 3.32, he thought he was jaundiced, urine was dark, and he did not have any nausea vomiting fever chills or itching, did have dark urine.  He went to the ED, had ultrasound done, the pain resolved and he was advised GI follow-up.  Patient denies any travels no one else around him sick, no new medications denies any illegal drug use, does use marijuana for chronic leg pain, on disability, denies any dysphagia coughing choking spells appears symptoms bowels are irregular.  No history of ulcer disease, denies any family history of gallbladder or biliary liver problems and GI malignancies.  Diet medications more than 10 systems reviewed.      REVIEW OF SYSTEMS:    CONSTITUTIONAL: Denies any fever, chills, rigors, and weight loss.  HEENT: No earache  "or tinnitus.  CARDIOVASCULAR: No chest pain or palpitations.   RESPIRATORY: Denies any cough, hemoptysis, shortness of breath or dyspnea on exertion.  GASTROINTESTINAL: As noted in the History of Present Illness.   GENITOURINARY: Denies any hematuria or dysuria.  NEUROLOGIC: No dizziness or vertigo.   MUSCULOSKELETAL: Denies any joint swellings.  SKIN: Denies skin rashes or itching.   ENDOCRINE: Denies excessive thirst. Denies intolerance to heat or cold.  PSYCHOSOCIAL: Denies depression or anxiety. Denies any recent memory loss.       Historical Information   Past Medical History:   Diagnosis Date   • High cholesterol    • Hypertension      Past Surgical History:   Procedure Laterality Date   • CARDIAC SURGERY     • HERNIA REPAIR     • JOINT REPLACEMENT       Social History   Social History     Substance and Sexual Activity   Alcohol Use Not Currently     Social History     Substance and Sexual Activity   Drug Use Yes   • Types: Marijuana     Social History     Tobacco Use   Smoking Status Never   Smokeless Tobacco Never     History reviewed. No pertinent family history.    Meds/Allergies       Current Outpatient Medications:   •  amLODIPine (NORVASC) 5 mg tablet  •  oxyCODONE (OxyCONTIN) 10 mg 12 hr tablet  •  metoprolol tartrate (LOPRESSOR) 50 mg tablet  •  pregabalin (LYRICA) 100 mg capsule    Allergies   Allergen Reactions   • Swine Bile Hives     Vomiting   • Penicillins Rash           Objective     Blood pressure (!) 155/107, pulse 82, height 5' 6\" (1.676 m), weight 85.5 kg (188 lb 6.4 oz). Body mass index is 30.41 kg/m².        PHYSICAL EXAM:      General Appearance:   Alert, cooperative, no distress   HEENT:   Normocephalic, atraumatic, anicteric.     Neck:  Supple, symmetrical, trachea midline   Lungs:   Clear to auscultation bilaterally; no rales, rhonchi or wheezing; respirations unlabored    Heart::   Regular rate and rhythm; no murmur.   Abdomen:   Soft, non-tender, non-distended; normal bowel sounds; " no masses, no organomegaly    Genitalia:   Deferred    Rectal:   Deferred    Extremities:  No cyanosis, clubbing or edema    Skin:  No jaundice, rashes, or lesions    Lymph nodes:  No palpable cervical lymphadenopathy        Lab Results:   No visits with results within 1 Day(s) from this visit.   Latest known visit with results is:   Admission on 12/31/2023, Discharged on 12/31/2023   Component Date Value   • WBC 12/31/2023 5.90    • RBC 12/31/2023 4.77    • Hemoglobin 12/31/2023 15.4    • Hematocrit 12/31/2023 45.9    • MCV 12/31/2023 96    • MCH 12/31/2023 32.3    • MCHC 12/31/2023 33.6    • RDW 12/31/2023 12.6    • MPV 12/31/2023 10.0    • Platelets 12/31/2023 295    • nRBC 12/31/2023 0    • Neutrophils Relative 12/31/2023 62    • Immat GRANS % 12/31/2023 0    • Lymphocytes Relative 12/31/2023 23    • Monocytes Relative 12/31/2023 12    • Eosinophils Relative 12/31/2023 2    • Basophils Relative 12/31/2023 1    • Neutrophils Absolute 12/31/2023 3.66    • Immature Grans Absolute 12/31/2023 0.02    • Lymphocytes Absolute 12/31/2023 1.35    • Monocytes Absolute 12/31/2023 0.71    • Eosinophils Absolute 12/31/2023 0.10    • Basophils Absolute 12/31/2023 0.06    • Sodium 12/31/2023 137    • Potassium 12/31/2023 3.4 (L)    • Chloride 12/31/2023 108    • CO2 12/31/2023 25    • ANION GAP 12/31/2023 4    • BUN 12/31/2023 10    • Creatinine 12/31/2023 1.03    • Glucose 12/31/2023 124    • Calcium 12/31/2023 8.5    • AST 12/31/2023 55 (H)    • ALT 12/31/2023 127 (H)    • Alkaline Phosphatase 12/31/2023 88    • Total Protein 12/31/2023 6.9    • Albumin 12/31/2023 3.7    • Total Bilirubin 12/31/2023 3.32 (H)    • eGFR 12/31/2023 83    • Lipase 12/31/2023 21    • Total CK 12/31/2023 249          Radiology Results:   US right upper quadrant    Result Date: 12/31/2023  Narrative: RIGHT UPPER QUADRANT ULTRASOUND INDICATION:     upper abdominal pain, increased bili and LFTs. COMPARISON: May 5, 2021 TECHNIQUE:   Real-time  ultrasound of the right upper quadrant was performed with a curvilinear transducer with both volumetric sweeps and still imaging techniques. FINDINGS: PANCREAS:  Visualized portions of the pancreas are within normal limits. AORTA AND IVC:  Visualized portions are normal for patient age. LIVER: Size:  Within normal range.  The liver measures 16.6 cm in the midclavicular line. Contour:  Surface contour is smooth. Parenchyma:  Echogenicity and echotexture are within normal limits. No liver mass identified. Limited imaging of the main portal vein shows it to be patent and hepatopetal. BILIARY: Gallbladder appears normal in caliber, no sludge, no stones. Borderline wall thickening of 4 mm. No sonographic Bland sign. No intrahepatic biliary dilatation. CBD measures 3.0 mm. No choledocholithiasis. KIDNEY: Right kidney measures 11.1 x 4.4 x 5.4 cm. Volume 138.9 mL Kidney within normal limits. ASCITES:   None.     Impression: Borderline gallbladder wall thickening of 4 mm but otherwise normal in appearance. Workstation performed: [id number]

## 2024-01-16 ENCOUNTER — NURSE TRIAGE (OUTPATIENT)
Age: 54
End: 2024-01-16

## 2024-01-16 NOTE — TELEPHONE ENCOUNTER
"Yenni from [facility] called to report Gamma and Chronic Hep Panel not covered by [employer] under current Dx code:    Elevated LFTs [R79.89]         Pt will incur a cost of approx $600.     Pt will need to be made aware of order updates. New orders can be faxed to [facility].    [facility] [phone] Fax: [phone]   Reason for Disposition  • Nursing judgment    Answer Assessment - Initial Assessment Questions  1. REASON FOR CALL or QUESTION: \"What is your reason for calling today?\" or \"How can I best help you?\" or \"What question do you have that I can help answer?\"      [facility] is requesting alternate Dx codes for Hep labs    Protocols used: Information Only Call - No Triage-ADULT-[place]    "

## 2024-01-18 NOTE — TELEPHONE ENCOUNTER
I spoke to [facility] and told them to hold off on doing Gamma and chronic hepatitis panel since it is not covered by [facility] with current diagnostic codes and it would cost patient's $600. Dr. Caraballo is currently out of the office but should be back next week and he can address it at that time, I will forward message to him.   Thank you

## 2024-02-06 ENCOUNTER — TELEPHONE (OUTPATIENT)
Age: 54
End: 2024-02-06